# Patient Record
Sex: FEMALE | Race: WHITE | NOT HISPANIC OR LATINO | Employment: OTHER | URBAN - NONMETROPOLITAN AREA
[De-identification: names, ages, dates, MRNs, and addresses within clinical notes are randomized per-mention and may not be internally consistent; named-entity substitution may affect disease eponyms.]

---

## 2017-01-01 ENCOUNTER — APPOINTMENT (OUTPATIENT)
Dept: GENERAL RADIOLOGY | Facility: HOSPITAL | Age: 72
End: 2017-01-01

## 2017-01-01 ENCOUNTER — APPOINTMENT (OUTPATIENT)
Dept: CT IMAGING | Facility: HOSPITAL | Age: 72
End: 2017-01-01

## 2017-01-01 ENCOUNTER — APPOINTMENT (OUTPATIENT)
Dept: ULTRASOUND IMAGING | Facility: HOSPITAL | Age: 72
End: 2017-01-01

## 2017-01-01 ENCOUNTER — HOSPITAL ENCOUNTER (EMERGENCY)
Facility: HOSPITAL | Age: 72
Discharge: ANOTHER HOSPITAL | End: 2017-11-28
Attending: EMERGENCY MEDICINE | Admitting: EMERGENCY MEDICINE

## 2017-01-01 VITALS
HEIGHT: 64 IN | SYSTOLIC BLOOD PRESSURE: 118 MMHG | HEART RATE: 89 BPM | WEIGHT: 165 LBS | RESPIRATION RATE: 18 BRPM | OXYGEN SATURATION: 95 % | TEMPERATURE: 97.6 F | DIASTOLIC BLOOD PRESSURE: 72 MMHG | BODY MASS INDEX: 28.17 KG/M2

## 2017-01-01 DIAGNOSIS — L03.114 CELLULITIS OF LEFT UPPER EXTREMITY: Primary | ICD-10-CM

## 2017-01-01 DIAGNOSIS — R60.1 ANASARCA: ICD-10-CM

## 2017-01-01 DIAGNOSIS — N28.9 ACUTE RENAL INSUFFICIENCY: ICD-10-CM

## 2017-01-01 DIAGNOSIS — J90 BILATERAL PLEURAL EFFUSION: ICD-10-CM

## 2017-01-01 DIAGNOSIS — R73.9 HYPERGLYCEMIA: ICD-10-CM

## 2017-01-01 LAB
ALBUMIN SERPL-MCNC: 2.8 G/DL (ref 3.5–5)
ALBUMIN/GLOB SERPL: 0.9 G/DL (ref 1–2)
ALP SERPL-CCNC: 140 U/L (ref 38–126)
ALT SERPL W P-5'-P-CCNC: 37 U/L (ref 13–69)
ANION GAP SERPL CALCULATED.3IONS-SCNC: 17.9 MMOL/L
AST SERPL-CCNC: 19 U/L (ref 15–46)
BACTERIA SPEC AEROBE CULT: ABNORMAL
BACTERIA SPEC AEROBE CULT: ABNORMAL
BACTERIA UR QL AUTO: ABNORMAL /HPF
BASOPHILS # BLD AUTO: 0.06 10*3/MM3 (ref 0–0.2)
BASOPHILS NFR BLD AUTO: 0.3 % (ref 0–2.5)
BILIRUB SERPL-MCNC: 0.9 MG/DL (ref 0.2–1.3)
BILIRUB UR QL STRIP: NEGATIVE
BUN BLD-MCNC: 60 MG/DL (ref 7–20)
BUN/CREAT SERPL: 46.2 (ref 7.1–23.5)
CALCIUM SPEC-SCNC: 8.4 MG/DL (ref 8.4–10.2)
CHLORIDE SERPL-SCNC: 94 MMOL/L (ref 98–107)
CLARITY UR: ABNORMAL
CO2 SERPL-SCNC: 18 MMOL/L (ref 26–30)
COLOR UR: YELLOW
CREAT BLD-MCNC: 1.3 MG/DL (ref 0.6–1.3)
CRP SERPL-MCNC: 20.2 MG/DL (ref 0–1)
DEPRECATED RDW RBC AUTO: 51.9 FL (ref 37–54)
EOSINOPHIL # BLD AUTO: 0.02 10*3/MM3 (ref 0–0.7)
EOSINOPHIL NFR BLD AUTO: 0.1 % (ref 0–7)
ERYTHROCYTE [DISTWIDTH] IN BLOOD BY AUTOMATED COUNT: 17.2 % (ref 11.5–14.5)
ERYTHROCYTE [SEDIMENTATION RATE] IN BLOOD: 23 MM/HR (ref 0–20)
GFR SERPL CREATININE-BSD FRML MDRD: 40 ML/MIN/1.73
GLOBULIN UR ELPH-MCNC: 3.1 GM/DL
GLUCOSE BLD-MCNC: 539 MG/DL (ref 74–98)
GLUCOSE UR STRIP-MCNC: ABNORMAL MG/DL
HCT VFR BLD AUTO: 36.2 % (ref 37–47)
HGB BLD-MCNC: 11 G/DL (ref 12–16)
HGB UR QL STRIP.AUTO: ABNORMAL
HYALINE CASTS UR QL AUTO: ABNORMAL /LPF
IMM GRANULOCYTES # BLD: 0.18 10*3/MM3 (ref 0–0.06)
IMM GRANULOCYTES NFR BLD: 0.8 % (ref 0–0.6)
KETONES UR QL STRIP: ABNORMAL
LEUKOCYTE ESTERASE UR QL STRIP.AUTO: NEGATIVE
LYMPHOCYTES # BLD AUTO: 0.78 10*3/MM3 (ref 0.6–3.4)
LYMPHOCYTES NFR BLD AUTO: 3.5 % (ref 10–50)
MCH RBC QN AUTO: 25.3 PG (ref 27–31)
MCHC RBC AUTO-ENTMCNC: 30.4 G/DL (ref 30–37)
MCV RBC AUTO: 83.2 FL (ref 81–99)
MONOCYTES # BLD AUTO: 1.23 10*3/MM3 (ref 0–0.9)
MONOCYTES NFR BLD AUTO: 5.6 % (ref 0–12)
NEUTROPHILS # BLD AUTO: 19.77 10*3/MM3 (ref 2–6.9)
NEUTROPHILS NFR BLD AUTO: 89.7 % (ref 37–80)
NITRITE UR QL STRIP: NEGATIVE
NRBC BLD MANUAL-RTO: 0 /100 WBC (ref 0–0)
NT-PROBNP SERPL-MCNC: ABNORMAL PG/ML (ref 0–125)
PH UR STRIP.AUTO: <=5 [PH] (ref 5–8)
PLATELET # BLD AUTO: 337 10*3/MM3 (ref 130–400)
PMV BLD AUTO: 10.3 FL (ref 6–12)
POTASSIUM BLD-SCNC: 4.9 MMOL/L (ref 3.5–5.1)
PROT SERPL-MCNC: 5.9 G/DL (ref 6.3–8.2)
PROT UR QL STRIP: NEGATIVE
RBC # BLD AUTO: 4.35 10*6/MM3 (ref 4.2–5.4)
RBC # UR: ABNORMAL /HPF
REF LAB TEST METHOD: ABNORMAL
SODIUM BLD-SCNC: 125 MMOL/L (ref 137–145)
SP GR UR STRIP: 1.01 (ref 1–1.03)
SQUAMOUS #/AREA URNS HPF: ABNORMAL /HPF
UROBILINOGEN UR QL STRIP: ABNORMAL
WBC NRBC COR # BLD: 22.04 10*3/MM3 (ref 4.8–10.8)
WBC UR QL AUTO: ABNORMAL /HPF

## 2017-01-01 PROCEDURE — 85025 COMPLETE CBC W/AUTO DIFF WBC: CPT | Performed by: EMERGENCY MEDICINE

## 2017-01-01 PROCEDURE — 93971 EXTREMITY STUDY: CPT

## 2017-01-01 PROCEDURE — 87077 CULTURE AEROBIC IDENTIFY: CPT | Performed by: EMERGENCY MEDICINE

## 2017-01-01 PROCEDURE — 87147 CULTURE TYPE IMMUNOLOGIC: CPT | Performed by: EMERGENCY MEDICINE

## 2017-01-01 PROCEDURE — 85651 RBC SED RATE NONAUTOMATED: CPT | Performed by: EMERGENCY MEDICINE

## 2017-01-01 PROCEDURE — P9612 CATHETERIZE FOR URINE SPEC: HCPCS

## 2017-01-01 PROCEDURE — 86140 C-REACTIVE PROTEIN: CPT | Performed by: EMERGENCY MEDICINE

## 2017-01-01 PROCEDURE — 96365 THER/PROPH/DIAG IV INF INIT: CPT

## 2017-01-01 PROCEDURE — 71010 HC CHEST PA OR AP: CPT

## 2017-01-01 PROCEDURE — 87086 URINE CULTURE/COLONY COUNT: CPT | Performed by: EMERGENCY MEDICINE

## 2017-01-01 PROCEDURE — 73080 X-RAY EXAM OF ELBOW: CPT

## 2017-01-01 PROCEDURE — 83880 ASSAY OF NATRIURETIC PEPTIDE: CPT | Performed by: EMERGENCY MEDICINE

## 2017-01-01 PROCEDURE — 87186 SC STD MICRODIL/AGAR DIL: CPT | Performed by: EMERGENCY MEDICINE

## 2017-01-01 PROCEDURE — 81001 URINALYSIS AUTO W/SCOPE: CPT | Performed by: EMERGENCY MEDICINE

## 2017-01-01 PROCEDURE — 99284 EMERGENCY DEPT VISIT MOD MDM: CPT

## 2017-01-01 PROCEDURE — 80053 COMPREHEN METABOLIC PANEL: CPT | Performed by: EMERGENCY MEDICINE

## 2017-01-01 PROCEDURE — 71250 CT THORAX DX C-: CPT

## 2017-01-01 RX ORDER — POTASSIUM CHLORIDE 750 MG/1
10 TABLET, FILM COATED, EXTENDED RELEASE ORAL 2 TIMES DAILY
COMMUNITY

## 2017-01-01 RX ORDER — CLINDAMYCIN PHOSPHATE 600 MG/50ML
600 INJECTION, SOLUTION INTRAVENOUS ONCE
Status: COMPLETED | OUTPATIENT
Start: 2017-01-01 | End: 2017-01-01

## 2017-01-01 RX ORDER — FUROSEMIDE 40 MG/1
40 TABLET ORAL 2 TIMES DAILY
Status: ON HOLD | COMMUNITY
End: 2018-01-01

## 2017-01-01 RX ORDER — SPIRONOLACTONE 25 MG/1
25 TABLET ORAL DAILY
COMMUNITY

## 2017-01-01 RX ADMIN — CLINDAMYCIN PHOSPHATE 600 MG: 600 INJECTION, SOLUTION INTRAVENOUS at 23:42

## 2018-01-01 ENCOUNTER — HOSPITAL ENCOUNTER (INPATIENT)
Facility: HOSPITAL | Age: 73
LOS: 21 days | End: 2018-06-06
Attending: EMERGENCY MEDICINE | Admitting: HOSPITALIST

## 2018-01-01 ENCOUNTER — HOSPITAL ENCOUNTER (INPATIENT)
Facility: HOSPITAL | Age: 73
LOS: 5 days | End: 2018-06-11
Attending: INTERNAL MEDICINE | Admitting: INTERNAL MEDICINE

## 2018-01-01 ENCOUNTER — APPOINTMENT (OUTPATIENT)
Dept: GENERAL RADIOLOGY | Facility: HOSPITAL | Age: 73
End: 2018-01-01

## 2018-01-01 ENCOUNTER — ANESTHESIA (OUTPATIENT)
Dept: GASTROENTEROLOGY | Facility: HOSPITAL | Age: 73
End: 2018-01-01

## 2018-01-01 ENCOUNTER — APPOINTMENT (OUTPATIENT)
Dept: ULTRASOUND IMAGING | Facility: HOSPITAL | Age: 73
End: 2018-01-01

## 2018-01-01 ENCOUNTER — APPOINTMENT (OUTPATIENT)
Dept: CARDIOLOGY | Facility: HOSPITAL | Age: 73
End: 2018-01-01
Attending: INTERNAL MEDICINE

## 2018-01-01 ENCOUNTER — ANESTHESIA EVENT (OUTPATIENT)
Dept: GASTROENTEROLOGY | Facility: HOSPITAL | Age: 73
End: 2018-01-01

## 2018-01-01 VITALS
BODY MASS INDEX: 27.71 KG/M2 | DIASTOLIC BLOOD PRESSURE: 51 MMHG | TEMPERATURE: 94.3 F | RESPIRATION RATE: 14 BRPM | OXYGEN SATURATION: 100 % | WEIGHT: 162.3 LBS | SYSTOLIC BLOOD PRESSURE: 92 MMHG | HEART RATE: 76 BPM | HEIGHT: 64 IN

## 2018-01-01 VITALS
OXYGEN SATURATION: 100 % | HEART RATE: 76 BPM | TEMPERATURE: 94.3 F | DIASTOLIC BLOOD PRESSURE: 51 MMHG | WEIGHT: 162.26 LBS | BODY MASS INDEX: 27.7 KG/M2 | SYSTOLIC BLOOD PRESSURE: 92 MMHG | HEIGHT: 64 IN | RESPIRATION RATE: 8 BRPM

## 2018-01-01 DIAGNOSIS — R13.10 DYSPHAGIA, UNSPECIFIED TYPE: ICD-10-CM

## 2018-01-01 DIAGNOSIS — Z74.09 DECREASED AMBULATION STATUS: ICD-10-CM

## 2018-01-01 DIAGNOSIS — Z74.09 IMPAIRED MOBILITY AND ADLS: ICD-10-CM

## 2018-01-01 DIAGNOSIS — K92.2 ACUTE GI BLEEDING: Primary | ICD-10-CM

## 2018-01-01 DIAGNOSIS — Z74.09 IMPAIRED FUNCTIONAL MOBILITY, BALANCE, GAIT, AND ENDURANCE: ICD-10-CM

## 2018-01-01 DIAGNOSIS — L89.154 DECUBITUS ULCER OF SACRAL REGION, STAGE 4 (HCC): ICD-10-CM

## 2018-01-01 DIAGNOSIS — N18.9 CHRONIC KIDNEY DISEASE, UNSPECIFIED CKD STAGE: ICD-10-CM

## 2018-01-01 DIAGNOSIS — Z79.01 PROPHYLACTIC USE OF UNFRACTIONATED HEPARIN FOR VENOUS THROMBOEMBOLISM: ICD-10-CM

## 2018-01-01 DIAGNOSIS — I95.89 OTHER SPECIFIED HYPOTENSION: ICD-10-CM

## 2018-01-01 DIAGNOSIS — Z78.9 IMPAIRED MOBILITY AND ADLS: ICD-10-CM

## 2018-01-01 DIAGNOSIS — Z87.19 HISTORY OF DUODENAL ULCER: ICD-10-CM

## 2018-01-01 DIAGNOSIS — I42.9 CARDIOMYOPATHY, UNSPECIFIED TYPE (HCC): ICD-10-CM

## 2018-01-01 LAB
ABO + RH BLD: NORMAL
ABO GROUP BLD: NORMAL
ABO GROUP BLD: NORMAL
ALBUMIN SERPL-MCNC: 2.9 G/DL (ref 3.2–4.8)
ALBUMIN SERPL-MCNC: 3 G/DL (ref 3.2–4.8)
ALBUMIN SERPL-MCNC: 3.1 G/DL (ref 3.2–4.8)
ALBUMIN SERPL-MCNC: 3.6 G/DL (ref 3.2–4.8)
ALBUMIN/GLOB SERPL: 0.8 G/DL (ref 1.5–2.5)
ALBUMIN/GLOB SERPL: 0.9 G/DL (ref 1.5–2.5)
ALBUMIN/GLOB SERPL: 1 G/DL (ref 1.5–2.5)
ALBUMIN/GLOB SERPL: 1.1 G/DL (ref 1.5–2.5)
ALP SERPL-CCNC: 100 U/L (ref 25–100)
ALP SERPL-CCNC: 101 U/L (ref 25–100)
ALP SERPL-CCNC: 103 U/L (ref 25–100)
ALP SERPL-CCNC: 113 U/L (ref 25–100)
ALP SERPL-CCNC: 144 U/L (ref 25–100)
ALP SERPL-CCNC: 75 U/L (ref 25–100)
ALT SERPL W P-5'-P-CCNC: 11 U/L (ref 7–40)
ALT SERPL W P-5'-P-CCNC: 13 U/L (ref 7–40)
ALT SERPL W P-5'-P-CCNC: 13 U/L (ref 7–40)
ALT SERPL W P-5'-P-CCNC: 3 U/L (ref 7–40)
ALT SERPL W P-5'-P-CCNC: 5 U/L (ref 7–40)
ALT SERPL W P-5'-P-CCNC: 7 U/L (ref 7–40)
ANA SER QL: NEGATIVE
ANION GAP SERPL CALCULATED.3IONS-SCNC: 13 MMOL/L (ref 3–11)
ANION GAP SERPL CALCULATED.3IONS-SCNC: 14 MMOL/L (ref 3–11)
ANION GAP SERPL CALCULATED.3IONS-SCNC: 16 MMOL/L (ref 3–11)
ANION GAP SERPL CALCULATED.3IONS-SCNC: 16 MMOL/L (ref 3–11)
ANION GAP SERPL CALCULATED.3IONS-SCNC: 17 MMOL/L (ref 3–11)
ANION GAP SERPL CALCULATED.3IONS-SCNC: 19 MMOL/L (ref 3–11)
ANION GAP SERPL CALCULATED.3IONS-SCNC: 21 MMOL/L (ref 3–11)
AST SERPL-CCNC: 12 U/L (ref 0–33)
AST SERPL-CCNC: 13 U/L (ref 0–33)
AST SERPL-CCNC: 16 U/L (ref 0–33)
AST SERPL-CCNC: 20 U/L (ref 0–33)
AST SERPL-CCNC: 20 U/L (ref 0–33)
AST SERPL-CCNC: 8 U/L (ref 0–33)
BACTERIA SPEC AEROBE CULT: ABNORMAL
BACTERIA SPEC AEROBE CULT: ABNORMAL
BACTERIA UR QL AUTO: ABNORMAL /HPF
BASOPHILS # BLD AUTO: 0.02 10*3/MM3 (ref 0–0.2)
BASOPHILS # BLD AUTO: 0.03 10*3/MM3 (ref 0–0.2)
BASOPHILS # BLD AUTO: 0.04 10*3/MM3 (ref 0–0.2)
BASOPHILS # BLD AUTO: 0.04 10*3/MM3 (ref 0–0.2)
BASOPHILS NFR BLD AUTO: 0.2 % (ref 0–1)
BASOPHILS NFR BLD AUTO: 0.3 % (ref 0–1)
BASOPHILS NFR BLD AUTO: 0.4 % (ref 0–1)
BASOPHILS NFR BLD AUTO: 0.4 % (ref 0–1)
BH BB BLOOD EXPIRATION DATE: NORMAL
BH BB BLOOD TYPE BARCODE: 9500
BH BB DISPENSE STATUS: NORMAL
BH BB PRODUCT CODE: NORMAL
BH BB UNIT NUMBER: NORMAL
BH CV ECHO MEAS - AO ROOT AREA (BSA CORRECTED): 1.3
BH CV ECHO MEAS - AO ROOT AREA: 4 CM^2
BH CV ECHO MEAS - AO ROOT DIAM: 2.3 CM
BH CV ECHO MEAS - BSA(HAYCOCK): 1.8 M^2
BH CV ECHO MEAS - BSA: 1.8 M^2
BH CV ECHO MEAS - BZI_BMI: 27.8 KILOGRAMS/M^2
BH CV ECHO MEAS - BZI_METRIC_HEIGHT: 162.6 CM
BH CV ECHO MEAS - BZI_METRIC_WEIGHT: 73.5 KG
BH CV ECHO MEAS - CONTRAST EF (2CH): 23.8 ML/M^2
BH CV ECHO MEAS - CONTRAST EF 4CH: 27.3 ML/M^2
BH CV ECHO MEAS - EDV(CUBED): 127 ML
BH CV ECHO MEAS - EDV(MOD-SP2): 105 ML
BH CV ECHO MEAS - EDV(MOD-SP4): 88 ML
BH CV ECHO MEAS - EDV(TEICH): 119.7 ML
BH CV ECHO MEAS - EF(CUBED): 34.8 %
BH CV ECHO MEAS - EF(MOD-BP): 20 %
BH CV ECHO MEAS - EF(MOD-SP2): 23.8 %
BH CV ECHO MEAS - EF(MOD-SP4): 27.3 %
BH CV ECHO MEAS - EF(TEICH): 28.4 %
BH CV ECHO MEAS - ESV(CUBED): 82.8 ML
BH CV ECHO MEAS - ESV(MOD-SP2): 80 ML
BH CV ECHO MEAS - ESV(MOD-SP4): 64 ML
BH CV ECHO MEAS - ESV(TEICH): 85.8 ML
BH CV ECHO MEAS - FS: 13.3 %
BH CV ECHO MEAS - IVS/LVPW: 1.1
BH CV ECHO MEAS - IVSD: 1.1 CM
BH CV ECHO MEAS - LA DIMENSION: 4.4 CM
BH CV ECHO MEAS - LA/AO: 2
BH CV ECHO MEAS - LAT PEAK E' VEL: 7.1 CM/SEC
BH CV ECHO MEAS - LV DIASTOLIC VOL/BSA (35-75): 49.2 ML/M^2
BH CV ECHO MEAS - LV MASS(C)D: 201.9 GRAMS
BH CV ECHO MEAS - LV MASS(C)DI: 112.9 GRAMS/M^2
BH CV ECHO MEAS - LV SYSTOLIC VOL/BSA (12-30): 35.8 ML/M^2
BH CV ECHO MEAS - LVIDD: 5 CM
BH CV ECHO MEAS - LVIDS: 4.4 CM
BH CV ECHO MEAS - LVLD AP2: 8 CM
BH CV ECHO MEAS - LVLD AP4: 7.7 CM
BH CV ECHO MEAS - LVLS AP2: 8.2 CM
BH CV ECHO MEAS - LVLS AP4: 8.3 CM
BH CV ECHO MEAS - LVPWD: 1 CM
BH CV ECHO MEAS - MED PEAK E' VEL: 4.2 CM/SEC
BH CV ECHO MEAS - MV A MAX VEL: 85.9 CM/SEC
BH CV ECHO MEAS - MV DEC SLOPE: 345.4 CM/SEC^2
BH CV ECHO MEAS - MV E MAX VEL: 106.6 CM/SEC
BH CV ECHO MEAS - MV E/A: 1.2
BH CV ECHO MEAS - PA ACC SLOPE: 646.9 CM/SEC^2
BH CV ECHO MEAS - PA ACC TIME: 0.1 SEC
BH CV ECHO MEAS - PA PR(ACCEL): 34.4 MMHG
BH CV ECHO MEAS - RAP SYSTOLE: 20 MMHG
BH CV ECHO MEAS - RVDD: 2.9 CM
BH CV ECHO MEAS - RVSP: 48 MMHG
BH CV ECHO MEAS - SI(CUBED): 24.7 ML/M^2
BH CV ECHO MEAS - SI(MOD-SP2): 14 ML/M^2
BH CV ECHO MEAS - SI(MOD-SP4): 13.4 ML/M^2
BH CV ECHO MEAS - SI(TEICH): 19 ML/M^2
BH CV ECHO MEAS - SV(CUBED): 44.2 ML
BH CV ECHO MEAS - SV(MOD-SP2): 25 ML
BH CV ECHO MEAS - SV(MOD-SP4): 24 ML
BH CV ECHO MEAS - SV(TEICH): 33.9 ML
BH CV ECHO MEAS - TAPSE (>1.6): 1.4 CM2
BH CV ECHO MEAS - TR MAX VEL: 262.4 CM/SEC
BH CV ECHO MEASUREMENTS AVERAGE E/E' RATIO: 18.87
BH CV XLRA - RV BASE: 4.1 CM
BH CV XLRA - RV LENGTH: 7.6 CM
BH CV XLRA - RV MID: 3 CM
BH CV XLRA - TDI S': 7.7 CM/SEC
BILIRUB SERPL-MCNC: 0.2 MG/DL (ref 0.3–1.2)
BILIRUB SERPL-MCNC: 0.7 MG/DL (ref 0.3–1.2)
BILIRUB SERPL-MCNC: 0.8 MG/DL (ref 0.3–1.2)
BILIRUB SERPL-MCNC: 0.8 MG/DL (ref 0.3–1.2)
BILIRUB SERPL-MCNC: 1 MG/DL (ref 0.3–1.2)
BILIRUB SERPL-MCNC: 1.8 MG/DL (ref 0.3–1.2)
BILIRUB UR QL STRIP: NEGATIVE
BLD GP AB SCN SERPL QL: NEGATIVE
BNP SERPL-MCNC: 1984 PG/ML (ref 0–100)
BNP SERPL-MCNC: >5000 PG/ML (ref 0–100)
BUN BLD-MCNC: 124 MG/DL (ref 9–23)
BUN BLD-MCNC: 125 MG/DL (ref 9–23)
BUN BLD-MCNC: 130 MG/DL (ref 9–23)
BUN BLD-MCNC: 130 MG/DL (ref 9–23)
BUN BLD-MCNC: 133 MG/DL (ref 9–23)
BUN BLD-MCNC: 136 MG/DL (ref 9–23)
BUN BLD-MCNC: 139 MG/DL (ref 9–23)
BUN/CREAT SERPL: 22.8 (ref 7–25)
BUN/CREAT SERPL: 25.8 (ref 7–25)
BUN/CREAT SERPL: 26.6 (ref 7–25)
BUN/CREAT SERPL: 26.6 (ref 7–25)
BUN/CREAT SERPL: 26.7 (ref 7–25)
BUN/CREAT SERPL: 27.1 (ref 7–25)
BUN/CREAT SERPL: 28.4 (ref 7–25)
C-ANCA TITR SER IF: NORMAL TITER
CA-I SERPL ISE-MCNC: 1.03 MMOL/L (ref 1.12–1.32)
CALCIUM SPEC-SCNC: 7.8 MG/DL (ref 8.7–10.4)
CALCIUM SPEC-SCNC: 7.9 MG/DL (ref 8.7–10.4)
CALCIUM SPEC-SCNC: 7.9 MG/DL (ref 8.7–10.4)
CALCIUM SPEC-SCNC: 8.4 MG/DL (ref 8.7–10.4)
CALCIUM SPEC-SCNC: 8.4 MG/DL (ref 8.7–10.4)
CALCIUM SPEC-SCNC: 8.6 MG/DL (ref 8.7–10.4)
CALCIUM SPEC-SCNC: 8.8 MG/DL (ref 8.7–10.4)
CHLORIDE SERPL-SCNC: 83 MMOL/L (ref 99–109)
CHLORIDE SERPL-SCNC: 86 MMOL/L (ref 99–109)
CHLORIDE SERPL-SCNC: 87 MMOL/L (ref 99–109)
CHLORIDE SERPL-SCNC: 89 MMOL/L (ref 99–109)
CHLORIDE SERPL-SCNC: 90 MMOL/L (ref 99–109)
CHLORIDE SERPL-SCNC: 90 MMOL/L (ref 99–109)
CHLORIDE SERPL-SCNC: 93 MMOL/L (ref 99–109)
CK SERPL-CCNC: 19 U/L (ref 26–174)
CLARITY UR: CLEAR
CO2 SERPL-SCNC: 19 MMOL/L (ref 20–31)
CO2 SERPL-SCNC: 24 MMOL/L (ref 20–31)
CO2 SERPL-SCNC: 25 MMOL/L (ref 20–31)
CO2 SERPL-SCNC: 25 MMOL/L (ref 20–31)
CO2 SERPL-SCNC: 28 MMOL/L (ref 20–31)
CO2 SERPL-SCNC: 30 MMOL/L (ref 20–31)
CO2 SERPL-SCNC: 31 MMOL/L (ref 20–31)
COLOR UR: YELLOW
CORTIS SERPL-MCNC: 31.3 MCG/DL
CORTIS SERPL-MCNC: 47.4 MCG/DL
CORTIS SERPL-MCNC: 61.7 MCG/DL
CREAT BLD-MCNC: 4.7 MG/DL (ref 0.6–1.3)
CREAT BLD-MCNC: 4.8 MG/DL (ref 0.6–1.3)
CREAT BLD-MCNC: 4.8 MG/DL (ref 0.6–1.3)
CREAT BLD-MCNC: 4.9 MG/DL (ref 0.6–1.3)
CREAT BLD-MCNC: 5 MG/DL (ref 0.6–1.3)
CREAT BLD-MCNC: 5.1 MG/DL (ref 0.6–1.3)
CREAT BLD-MCNC: 5.7 MG/DL (ref 0.6–1.3)
CREAT UR-MCNC: 37.9 MG/DL
CYTO UR: NORMAL
D-LACTATE SERPL-SCNC: 1.1 MMOL/L (ref 0.5–2)
D-LACTATE SERPL-SCNC: 1.5 MMOL/L (ref 0.5–2)
D-LACTATE SERPL-SCNC: 1.5 MMOL/L (ref 0.5–2)
D-LACTATE SERPL-SCNC: 1.6 MMOL/L (ref 0.5–2)
D-LACTATE SERPL-SCNC: 1.8 MMOL/L (ref 0.5–2)
D-LACTATE SERPL-SCNC: 2.4 MMOL/L (ref 0.5–2)
DEPRECATED RDW RBC AUTO: 62.7 FL (ref 37–54)
DEPRECATED RDW RBC AUTO: 62.9 FL (ref 37–54)
DEPRECATED RDW RBC AUTO: 63.9 FL (ref 37–54)
DEPRECATED RDW RBC AUTO: 65.7 FL (ref 37–54)
DEPRECATED RDW RBC AUTO: 69.3 FL (ref 37–54)
DEPRECATED RDW RBC AUTO: 70.4 FL (ref 37–54)
DEVELOPER EXPIRATION DATE: ABNORMAL
DEVELOPER LOT NUMBER: ABNORMAL
EOSINOPHIL # BLD AUTO: 0 10*3/MM3 (ref 0–0.3)
EOSINOPHIL # BLD AUTO: 0.06 10*3/MM3 (ref 0–0.3)
EOSINOPHIL # BLD AUTO: 0.09 10*3/MM3 (ref 0–0.3)
EOSINOPHIL # BLD AUTO: 0.11 10*3/MM3 (ref 0–0.3)
EOSINOPHIL # BLD AUTO: 0.13 10*3/MM3 (ref 0–0.3)
EOSINOPHIL # BLD AUTO: 0.13 10*3/MM3 (ref 0–0.3)
EOSINOPHIL NFR BLD AUTO: 0 % (ref 0–3)
EOSINOPHIL NFR BLD AUTO: 0.6 % (ref 0–3)
EOSINOPHIL NFR BLD AUTO: 1 % (ref 0–3)
EOSINOPHIL NFR BLD AUTO: 1.1 % (ref 0–3)
EOSINOPHIL NFR BLD AUTO: 1.2 % (ref 0–3)
EOSINOPHIL NFR BLD AUTO: 1.4 % (ref 0–3)
EOSINOPHIL SPEC QL MICRO: 0 % EOS/100 CELLS (ref 0–0)
ERYTHROCYTE [DISTWIDTH] IN BLOOD BY AUTOMATED COUNT: 19.3 % (ref 11.3–14.5)
ERYTHROCYTE [DISTWIDTH] IN BLOOD BY AUTOMATED COUNT: 19.5 % (ref 11.3–14.5)
ERYTHROCYTE [DISTWIDTH] IN BLOOD BY AUTOMATED COUNT: 19.7 % (ref 11.3–14.5)
ERYTHROCYTE [DISTWIDTH] IN BLOOD BY AUTOMATED COUNT: 19.9 % (ref 11.3–14.5)
ERYTHROCYTE [DISTWIDTH] IN BLOOD BY AUTOMATED COUNT: 19.9 % (ref 11.3–14.5)
ERYTHROCYTE [DISTWIDTH] IN BLOOD BY AUTOMATED COUNT: 20.4 % (ref 11.3–14.5)
EST. AVERAGE GLUCOSE BLD GHB EST-MCNC: 105 MG/DL
EXPIRATION DATE: ABNORMAL
FECAL OCCULT BLOOD SCREEN, POC: POSITIVE
GFR SERPL CREATININE-BSD FRML MDRD: 7 ML/MIN/1.73
GFR SERPL CREATININE-BSD FRML MDRD: 8 ML/MIN/1.73
GFR SERPL CREATININE-BSD FRML MDRD: 9 ML/MIN/1.73
GLOBULIN UR ELPH-MCNC: 3.1 GM/DL
GLOBULIN UR ELPH-MCNC: 3.1 GM/DL
GLOBULIN UR ELPH-MCNC: 3.2 GM/DL
GLOBULIN UR ELPH-MCNC: 3.3 GM/DL
GLOBULIN UR ELPH-MCNC: 3.3 GM/DL
GLOBULIN UR ELPH-MCNC: 3.8 GM/DL
GLUCOSE BLD-MCNC: 100 MG/DL (ref 70–100)
GLUCOSE BLD-MCNC: 125 MG/DL (ref 70–100)
GLUCOSE BLD-MCNC: 163 MG/DL (ref 70–100)
GLUCOSE BLD-MCNC: 189 MG/DL (ref 70–100)
GLUCOSE BLD-MCNC: 89 MG/DL (ref 70–100)
GLUCOSE BLD-MCNC: 98 MG/DL (ref 70–100)
GLUCOSE BLD-MCNC: 99 MG/DL (ref 70–100)
GLUCOSE BLDC GLUCOMTR-MCNC: 102 MG/DL (ref 70–130)
GLUCOSE BLDC GLUCOMTR-MCNC: 104 MG/DL (ref 70–130)
GLUCOSE BLDC GLUCOMTR-MCNC: 105 MG/DL (ref 70–130)
GLUCOSE BLDC GLUCOMTR-MCNC: 106 MG/DL (ref 70–130)
GLUCOSE BLDC GLUCOMTR-MCNC: 106 MG/DL (ref 70–130)
GLUCOSE BLDC GLUCOMTR-MCNC: 107 MG/DL (ref 70–130)
GLUCOSE BLDC GLUCOMTR-MCNC: 109 MG/DL (ref 70–130)
GLUCOSE BLDC GLUCOMTR-MCNC: 109 MG/DL (ref 70–130)
GLUCOSE BLDC GLUCOMTR-MCNC: 110 MG/DL (ref 70–130)
GLUCOSE BLDC GLUCOMTR-MCNC: 115 MG/DL (ref 70–130)
GLUCOSE BLDC GLUCOMTR-MCNC: 116 MG/DL (ref 70–130)
GLUCOSE BLDC GLUCOMTR-MCNC: 119 MG/DL (ref 70–130)
GLUCOSE BLDC GLUCOMTR-MCNC: 119 MG/DL (ref 70–130)
GLUCOSE BLDC GLUCOMTR-MCNC: 121 MG/DL (ref 70–130)
GLUCOSE BLDC GLUCOMTR-MCNC: 129 MG/DL (ref 70–130)
GLUCOSE BLDC GLUCOMTR-MCNC: 131 MG/DL (ref 70–130)
GLUCOSE BLDC GLUCOMTR-MCNC: 131 MG/DL (ref 70–130)
GLUCOSE BLDC GLUCOMTR-MCNC: 135 MG/DL (ref 70–130)
GLUCOSE BLDC GLUCOMTR-MCNC: 135 MG/DL (ref 70–130)
GLUCOSE BLDC GLUCOMTR-MCNC: 141 MG/DL (ref 70–130)
GLUCOSE BLDC GLUCOMTR-MCNC: 142 MG/DL (ref 70–130)
GLUCOSE BLDC GLUCOMTR-MCNC: 145 MG/DL (ref 70–130)
GLUCOSE BLDC GLUCOMTR-MCNC: 146 MG/DL (ref 70–130)
GLUCOSE BLDC GLUCOMTR-MCNC: 146 MG/DL (ref 70–130)
GLUCOSE BLDC GLUCOMTR-MCNC: 149 MG/DL (ref 70–130)
GLUCOSE BLDC GLUCOMTR-MCNC: 150 MG/DL (ref 70–130)
GLUCOSE BLDC GLUCOMTR-MCNC: 152 MG/DL (ref 70–130)
GLUCOSE BLDC GLUCOMTR-MCNC: 152 MG/DL (ref 70–130)
GLUCOSE BLDC GLUCOMTR-MCNC: 155 MG/DL (ref 70–130)
GLUCOSE BLDC GLUCOMTR-MCNC: 155 MG/DL (ref 70–130)
GLUCOSE BLDC GLUCOMTR-MCNC: 158 MG/DL (ref 70–130)
GLUCOSE BLDC GLUCOMTR-MCNC: 171 MG/DL (ref 70–130)
GLUCOSE BLDC GLUCOMTR-MCNC: 177 MG/DL (ref 70–130)
GLUCOSE BLDC GLUCOMTR-MCNC: 179 MG/DL (ref 70–130)
GLUCOSE BLDC GLUCOMTR-MCNC: 181 MG/DL (ref 70–130)
GLUCOSE BLDC GLUCOMTR-MCNC: 186 MG/DL (ref 70–130)
GLUCOSE BLDC GLUCOMTR-MCNC: 206 MG/DL (ref 70–130)
GLUCOSE BLDC GLUCOMTR-MCNC: 336 MG/DL (ref 70–130)
GLUCOSE BLDC GLUCOMTR-MCNC: 63 MG/DL (ref 70–130)
GLUCOSE BLDC GLUCOMTR-MCNC: 65 MG/DL (ref 70–130)
GLUCOSE BLDC GLUCOMTR-MCNC: 66 MG/DL (ref 70–130)
GLUCOSE BLDC GLUCOMTR-MCNC: 67 MG/DL (ref 70–130)
GLUCOSE BLDC GLUCOMTR-MCNC: 68 MG/DL (ref 70–130)
GLUCOSE BLDC GLUCOMTR-MCNC: 69 MG/DL (ref 70–130)
GLUCOSE BLDC GLUCOMTR-MCNC: 71 MG/DL (ref 70–130)
GLUCOSE BLDC GLUCOMTR-MCNC: 71 MG/DL (ref 70–130)
GLUCOSE BLDC GLUCOMTR-MCNC: 76 MG/DL (ref 70–130)
GLUCOSE BLDC GLUCOMTR-MCNC: 78 MG/DL (ref 70–130)
GLUCOSE BLDC GLUCOMTR-MCNC: 81 MG/DL (ref 70–130)
GLUCOSE BLDC GLUCOMTR-MCNC: 84 MG/DL (ref 70–130)
GLUCOSE BLDC GLUCOMTR-MCNC: 85 MG/DL (ref 70–130)
GLUCOSE BLDC GLUCOMTR-MCNC: 85 MG/DL (ref 70–130)
GLUCOSE BLDC GLUCOMTR-MCNC: 86 MG/DL (ref 70–130)
GLUCOSE BLDC GLUCOMTR-MCNC: 87 MG/DL (ref 70–130)
GLUCOSE BLDC GLUCOMTR-MCNC: 90 MG/DL (ref 70–130)
GLUCOSE BLDC GLUCOMTR-MCNC: 90 MG/DL (ref 70–130)
GLUCOSE BLDC GLUCOMTR-MCNC: 91 MG/DL (ref 70–130)
GLUCOSE BLDC GLUCOMTR-MCNC: 93 MG/DL (ref 70–130)
GLUCOSE BLDC GLUCOMTR-MCNC: 95 MG/DL (ref 70–130)
GLUCOSE BLDC GLUCOMTR-MCNC: 96 MG/DL (ref 70–130)
GLUCOSE UR STRIP-MCNC: NEGATIVE MG/DL
HBA1C MFR BLD: 5.3 % (ref 4.8–5.6)
HCT VFR BLD AUTO: 16.3 % (ref 34.5–44)
HCT VFR BLD AUTO: 22.1 % (ref 34.5–44)
HCT VFR BLD AUTO: 22.8 % (ref 34.5–44)
HCT VFR BLD AUTO: 25 % (ref 34.5–44)
HCT VFR BLD AUTO: 25.1 % (ref 34.5–44)
HCT VFR BLD AUTO: 25.8 % (ref 34.5–44)
HCT VFR BLD AUTO: 27 % (ref 34.5–44)
HCT VFR BLD AUTO: 30 % (ref 34.5–44)
HCT VFR BLD AUTO: 30 % (ref 34.5–44)
HCT VFR BLD AUTO: 30.5 % (ref 34.5–44)
HCT VFR BLD AUTO: 31.1 % (ref 34.5–44)
HCT VFR BLD AUTO: 35.2 % (ref 34.5–44)
HCT VFR BLD AUTO: 38.5 % (ref 34.5–44)
HGB BLD-MCNC: 10 G/DL (ref 11.5–15.5)
HGB BLD-MCNC: 10.1 G/DL (ref 11.5–15.5)
HGB BLD-MCNC: 10.1 G/DL (ref 11.5–15.5)
HGB BLD-MCNC: 11.5 G/DL (ref 11.5–15.5)
HGB BLD-MCNC: 12.3 G/DL (ref 11.5–15.5)
HGB BLD-MCNC: 5.1 G/DL (ref 11.5–15.5)
HGB BLD-MCNC: 7.1 G/DL (ref 11.5–15.5)
HGB BLD-MCNC: 7.4 G/DL (ref 11.5–15.5)
HGB BLD-MCNC: 8.1 G/DL (ref 11.5–15.5)
HGB BLD-MCNC: 8.4 G/DL (ref 11.5–15.5)
HGB BLD-MCNC: 8.5 G/DL (ref 11.5–15.5)
HGB BLD-MCNC: 8.6 G/DL (ref 11.5–15.5)
HGB BLD-MCNC: 9.9 G/DL (ref 11.5–15.5)
HGB UR QL STRIP.AUTO: ABNORMAL
HOLD SPECIMEN: NORMAL
HYALINE CASTS UR QL AUTO: ABNORMAL /LPF
IGA SERPL-MCNC: 495 MG/DL (ref 64–422)
IGG SERPL-MCNC: 1336 MG/DL (ref 700–1600)
IGM SERPL-MCNC: 52 MG/DL (ref 26–217)
IMM GRANULOCYTES # BLD: 0.02 10*3/MM3 (ref 0–0.03)
IMM GRANULOCYTES # BLD: 0.02 10*3/MM3 (ref 0–0.03)
IMM GRANULOCYTES # BLD: 0.03 10*3/MM3 (ref 0–0.03)
IMM GRANULOCYTES # BLD: 0.06 10*3/MM3 (ref 0–0.03)
IMM GRANULOCYTES # BLD: 0.07 10*3/MM3 (ref 0–0.03)
IMM GRANULOCYTES # BLD: 0.12 10*3/MM3 (ref 0–0.03)
IMM GRANULOCYTES NFR BLD: 0.2 % (ref 0–0.6)
IMM GRANULOCYTES NFR BLD: 0.2 % (ref 0–0.6)
IMM GRANULOCYTES NFR BLD: 0.3 % (ref 0–0.6)
IMM GRANULOCYTES NFR BLD: 0.5 % (ref 0–0.6)
IMM GRANULOCYTES NFR BLD: 0.7 % (ref 0–0.6)
IMM GRANULOCYTES NFR BLD: 1.2 % (ref 0–0.6)
INR PPP: 1.36 (ref 0.91–1.09)
INTERPRETATION UR IFE-IMP: NORMAL
IRON 24H UR-MRATE: 228 MCG/DL (ref 50–175)
IRON SATN MFR SERPL: 84 % (ref 15–50)
KETONES UR QL STRIP: NEGATIVE
LAB AP CASE REPORT: NORMAL
LAB AP CLINICAL INFORMATION: NORMAL
LEUKOCYTE ESTERASE UR QL STRIP.AUTO: ABNORMAL
LV EF 2D ECHO EST: 20 %
LYMPHOCYTES # BLD AUTO: 1.34 10*3/MM3 (ref 0.6–4.8)
LYMPHOCYTES # BLD AUTO: 1.56 10*3/MM3 (ref 0.6–4.8)
LYMPHOCYTES # BLD AUTO: 1.57 10*3/MM3 (ref 0.6–4.8)
LYMPHOCYTES # BLD AUTO: 1.8 10*3/MM3 (ref 0.6–4.8)
LYMPHOCYTES # BLD AUTO: 1.9 10*3/MM3 (ref 0.6–4.8)
LYMPHOCYTES # BLD AUTO: 1.97 10*3/MM3 (ref 0.6–4.8)
LYMPHOCYTES NFR BLD AUTO: 14.3 % (ref 24–44)
LYMPHOCYTES NFR BLD AUTO: 16.3 % (ref 24–44)
LYMPHOCYTES NFR BLD AUTO: 16.6 % (ref 24–44)
LYMPHOCYTES NFR BLD AUTO: 17.9 % (ref 24–44)
LYMPHOCYTES NFR BLD AUTO: 18.2 % (ref 24–44)
LYMPHOCYTES NFR BLD AUTO: 20.9 % (ref 24–44)
Lab: ABNORMAL
Lab: NORMAL
MAGNESIUM SERPL-MCNC: 2.2 MG/DL (ref 1.3–2.7)
MAGNESIUM SERPL-MCNC: 2.2 MG/DL (ref 1.3–2.7)
MAGNESIUM SERPL-MCNC: 2.3 MG/DL (ref 1.3–2.7)
MAGNESIUM SERPL-MCNC: 2.4 MG/DL (ref 1.3–2.7)
MAXIMAL PREDICTED HEART RATE: 149 BPM
MCH RBC QN AUTO: 29.6 PG (ref 27–31)
MCH RBC QN AUTO: 29.8 PG (ref 27–31)
MCH RBC QN AUTO: 29.9 PG (ref 27–31)
MCH RBC QN AUTO: 30.3 PG (ref 27–31)
MCH RBC QN AUTO: 30.6 PG (ref 27–31)
MCH RBC QN AUTO: 30.7 PG (ref 27–31)
MCHC RBC AUTO-ENTMCNC: 31.3 G/DL (ref 32–36)
MCHC RBC AUTO-ENTMCNC: 32.1 G/DL (ref 32–36)
MCHC RBC AUTO-ENTMCNC: 32.2 G/DL (ref 32–36)
MCHC RBC AUTO-ENTMCNC: 32.5 G/DL (ref 32–36)
MCHC RBC AUTO-ENTMCNC: 32.9 G/DL (ref 32–36)
MCHC RBC AUTO-ENTMCNC: 33.7 G/DL (ref 32–36)
MCV RBC AUTO: 88.5 FL (ref 80–99)
MCV RBC AUTO: 91 FL (ref 80–99)
MCV RBC AUTO: 92.8 FL (ref 80–99)
MCV RBC AUTO: 92.8 FL (ref 80–99)
MCV RBC AUTO: 94.4 FL (ref 80–99)
MCV RBC AUTO: 98.2 FL (ref 80–99)
MONOCYTES # BLD AUTO: 0.45 10*3/MM3 (ref 0–1)
MONOCYTES # BLD AUTO: 0.51 10*3/MM3 (ref 0–1)
MONOCYTES # BLD AUTO: 0.6 10*3/MM3 (ref 0–1)
MONOCYTES # BLD AUTO: 0.62 10*3/MM3 (ref 0–1)
MONOCYTES # BLD AUTO: 0.87 10*3/MM3 (ref 0–1)
MONOCYTES # BLD AUTO: 0.92 10*3/MM3 (ref 0–1)
MONOCYTES NFR BLD AUTO: 5.2 % (ref 0–12)
MONOCYTES NFR BLD AUTO: 5.4 % (ref 0–12)
MONOCYTES NFR BLD AUTO: 6.4 % (ref 0–12)
MONOCYTES NFR BLD AUTO: 6.6 % (ref 0–12)
MONOCYTES NFR BLD AUTO: 7.9 % (ref 0–12)
MONOCYTES NFR BLD AUTO: 8.7 % (ref 0–12)
MYELOPEROXIDASE AB SER-ACNC: <9 U/ML (ref 0–9)
NEGATIVE CONTROL: NEGATIVE
NEUTROPHILS # BLD AUTO: 6.45 10*3/MM3 (ref 1.5–8.3)
NEUTROPHILS # BLD AUTO: 6.67 10*3/MM3 (ref 1.5–8.3)
NEUTROPHILS # BLD AUTO: 7.17 10*3/MM3 (ref 1.5–8.3)
NEUTROPHILS # BLD AUTO: 7.26 10*3/MM3 (ref 1.5–8.3)
NEUTROPHILS # BLD AUTO: 7.32 10*3/MM3 (ref 1.5–8.3)
NEUTROPHILS # BLD AUTO: 8.71 10*3/MM3 (ref 1.5–8.3)
NEUTROPHILS NFR BLD AUTO: 70.9 % (ref 41–71)
NEUTROPHILS NFR BLD AUTO: 72.4 % (ref 41–71)
NEUTROPHILS NFR BLD AUTO: 74.4 % (ref 41–71)
NEUTROPHILS NFR BLD AUTO: 75.1 % (ref 41–71)
NEUTROPHILS NFR BLD AUTO: 76.5 % (ref 41–71)
NEUTROPHILS NFR BLD AUTO: 78.2 % (ref 41–71)
NITRITE UR QL STRIP: NEGATIVE
OSMOLALITY SERPL: 318 MOSM/KG (ref 275–295)
OSMOLALITY UR: 323 MOSM/KG (ref 300–1100)
P-ANCA ATYPICAL TITR SER IF: NORMAL TITER
P-ANCA TITR SER IF: NORMAL TITER
PATH REPORT.FINAL DX SPEC: NORMAL
PATH REPORT.GROSS SPEC: NORMAL
PH UR STRIP.AUTO: 8 [PH] (ref 5–8)
PHOSPHATE SERPL-MCNC: 8.2 MG/DL (ref 2.4–5.1)
PHOSPHATE SERPL-MCNC: 8.4 MG/DL (ref 2.4–5.1)
PHOSPHATE SERPL-MCNC: 8.8 MG/DL (ref 2.4–5.1)
PHOSPHATE SERPL-MCNC: 8.9 MG/DL (ref 2.4–5.1)
PHOSPHATE SERPL-MCNC: 9.1 MG/DL (ref 2.4–5.1)
PHOSPHATE SERPL-MCNC: 9.5 MG/DL (ref 2.4–5.1)
PLAT MORPH BLD: NORMAL
PLAT MORPH BLD: NORMAL
PLATELET # BLD AUTO: 232 10*3/MM3 (ref 150–450)
PLATELET # BLD AUTO: 254 10*3/MM3 (ref 150–450)
PLATELET # BLD AUTO: 267 10*3/MM3 (ref 150–450)
PLATELET # BLD AUTO: 270 10*3/MM3 (ref 150–450)
PLATELET # BLD AUTO: 300 10*3/MM3 (ref 150–450)
PLATELET # BLD AUTO: 306 10*3/MM3 (ref 150–450)
PMV BLD AUTO: 10.2 FL (ref 6–12)
PMV BLD AUTO: 10.3 FL (ref 6–12)
PMV BLD AUTO: 10.4 FL (ref 6–12)
PMV BLD AUTO: 9.6 FL (ref 6–12)
PMV BLD AUTO: 9.8 FL (ref 6–12)
PMV BLD AUTO: 9.9 FL (ref 6–12)
POSITIVE CONTROL: POSITIVE
POTASSIUM BLD-SCNC: 4.5 MMOL/L (ref 3.5–5.5)
POTASSIUM BLD-SCNC: 4.6 MMOL/L (ref 3.5–5.5)
POTASSIUM BLD-SCNC: 4.7 MMOL/L (ref 3.5–5.5)
POTASSIUM BLD-SCNC: 4.9 MMOL/L (ref 3.5–5.5)
POTASSIUM BLD-SCNC: 5 MMOL/L (ref 3.5–5.5)
POTASSIUM BLD-SCNC: 5.4 MMOL/L (ref 3.5–5.5)
POTASSIUM BLD-SCNC: 5.4 MMOL/L (ref 3.5–5.5)
PROT PATTERN SERPL IFE-IMP: ABNORMAL
PROT SERPL-MCNC: 6 G/DL (ref 5.7–8.2)
PROT SERPL-MCNC: 6.2 G/DL (ref 5.7–8.2)
PROT SERPL-MCNC: 6.2 G/DL (ref 5.7–8.2)
PROT SERPL-MCNC: 6.3 G/DL (ref 5.7–8.2)
PROT SERPL-MCNC: 6.8 G/DL (ref 5.7–8.2)
PROT SERPL-MCNC: 6.9 G/DL (ref 5.7–8.2)
PROT UR QL STRIP: ABNORMAL
PROT UR-MCNC: 44 MG/DL (ref 1–14)
PROTEINASE3 AB SER IA-ACNC: <3.5 U/ML (ref 0–3.5)
PROTHROMBIN TIME: 14.3 SECONDS (ref 9.6–11.5)
RBC # BLD AUTO: 1.66 10*6/MM3 (ref 3.89–5.14)
RBC # BLD AUTO: 2.34 10*6/MM3 (ref 3.89–5.14)
RBC # BLD AUTO: 2.78 10*6/MM3 (ref 3.89–5.14)
RBC # BLD AUTO: 3.35 10*6/MM3 (ref 3.89–5.14)
RBC # BLD AUTO: 3.35 10*6/MM3 (ref 3.89–5.14)
RBC # BLD AUTO: 3.39 10*6/MM3 (ref 3.89–5.14)
RBC # UR: ABNORMAL /HPF
RBC MORPH BLD: NORMAL
RBC MORPH BLD: NORMAL
REF LAB TEST METHOD: ABNORMAL
RETICS/RBC NFR AUTO: 2.95 % (ref 0.5–1.5)
RH BLD: NEGATIVE
RH BLD: NEGATIVE
SODIUM BLD-SCNC: 128 MMOL/L (ref 132–146)
SODIUM BLD-SCNC: 129 MMOL/L (ref 132–146)
SODIUM BLD-SCNC: 131 MMOL/L (ref 132–146)
SODIUM BLD-SCNC: 131 MMOL/L (ref 132–146)
SODIUM BLD-SCNC: 132 MMOL/L (ref 132–146)
SODIUM BLD-SCNC: 132 MMOL/L (ref 132–146)
SODIUM BLD-SCNC: 133 MMOL/L (ref 132–146)
SODIUM UR-SCNC: 40 MMOL/L (ref 30–90)
SP GR UR STRIP: 1.01 (ref 1–1.03)
SQUAMOUS #/AREA URNS HPF: ABNORMAL /HPF
STRESS TARGET HR: 127 BPM
T&S EXPIRATION DATE: NORMAL
TIBC SERPL-MCNC: 273 MCG/DL (ref 250–450)
TROPONIN I SERPL-MCNC: 0.02 NG/ML (ref 0–0.07)
TROPONIN I SERPL-MCNC: 0.03 NG/ML
TROPONIN I SERPL-MCNC: 0.12 NG/ML
TSH SERPL DL<=0.05 MIU/L-ACNC: 21.64 MIU/ML (ref 0.35–5.35)
UNIT  ABO: NORMAL
UNIT  RH: NORMAL
URATE SERPL-MCNC: 16.5 MG/DL (ref 3.1–7.8)
UROBILINOGEN UR QL STRIP: ABNORMAL
VIT B12 BLD-MCNC: 653 PG/ML (ref 211–911)
WBC MORPH BLD: NORMAL
WBC MORPH BLD: NORMAL
WBC NRBC COR # BLD: 10.03 10*3/MM3 (ref 3.5–10.8)
WBC NRBC COR # BLD: 11.69 10*3/MM3 (ref 3.5–10.8)
WBC NRBC COR # BLD: 8.61 10*3/MM3 (ref 3.5–10.8)
WBC NRBC COR # BLD: 9.37 10*3/MM3 (ref 3.5–10.8)
WBC NRBC COR # BLD: 9.38 10*3/MM3 (ref 3.5–10.8)
WBC NRBC COR # BLD: 9.41 10*3/MM3 (ref 3.5–10.8)
WBC UR QL AUTO: ABNORMAL /HPF
WHOLE BLOOD HOLD SPECIMEN: NORMAL
WHOLE BLOOD HOLD SPECIMEN: NORMAL

## 2018-01-01 PROCEDURE — 84156 ASSAY OF PROTEIN URINE: CPT | Performed by: INTERNAL MEDICINE

## 2018-01-01 PROCEDURE — 85018 HEMOGLOBIN: CPT | Performed by: INTERNAL MEDICINE

## 2018-01-01 PROCEDURE — 85610 PROTHROMBIN TIME: CPT | Performed by: HOSPITALIST

## 2018-01-01 PROCEDURE — 99231 SBSQ HOSP IP/OBS SF/LOW 25: CPT | Performed by: INTERNAL MEDICINE

## 2018-01-01 PROCEDURE — 25010000002 ONDANSETRON PER 1 MG: Performed by: NURSE PRACTITIONER

## 2018-01-01 PROCEDURE — 83605 ASSAY OF LACTIC ACID: CPT | Performed by: INTERNAL MEDICINE

## 2018-01-01 PROCEDURE — 25010000002 AZITHROMYCIN: Performed by: HOSPITALIST

## 2018-01-01 PROCEDURE — 84100 ASSAY OF PHOSPHORUS: CPT | Performed by: INTERNAL MEDICINE

## 2018-01-01 PROCEDURE — 92610 EVALUATE SWALLOWING FUNCTION: CPT

## 2018-01-01 PROCEDURE — 93306 TTE W/DOPPLER COMPLETE: CPT

## 2018-01-01 PROCEDURE — 0DD68ZX EXTRACTION OF STOMACH, VIA NATURAL OR ARTIFICIAL OPENING ENDOSCOPIC, DIAGNOSTIC: ICD-10-PCS | Performed by: INTERNAL MEDICINE

## 2018-01-01 PROCEDURE — 25010000002 HEPARIN (PORCINE) PER 1000 UNITS: Performed by: HOSPITALIST

## 2018-01-01 PROCEDURE — 99232 SBSQ HOSP IP/OBS MODERATE 35: CPT | Performed by: HOSPITALIST

## 2018-01-01 PROCEDURE — 80053 COMPREHEN METABOLIC PANEL: CPT | Performed by: INTERNAL MEDICINE

## 2018-01-01 PROCEDURE — 82962 GLUCOSE BLOOD TEST: CPT

## 2018-01-01 PROCEDURE — 25010000002 LORAZEPAM PER 2 MG: Performed by: INTERNAL MEDICINE

## 2018-01-01 PROCEDURE — 93010 ELECTROCARDIOGRAM REPORT: CPT | Performed by: INTERNAL MEDICINE

## 2018-01-01 PROCEDURE — 85025 COMPLETE CBC W/AUTO DIFF WBC: CPT | Performed by: NURSE PRACTITIONER

## 2018-01-01 PROCEDURE — 86923 COMPATIBILITY TEST ELECTRIC: CPT

## 2018-01-01 PROCEDURE — 25010000002 MORPHINE SULFATE (PF) 2 MG/ML SOLUTION: Performed by: NURSE PRACTITIONER

## 2018-01-01 PROCEDURE — 25010000002 MORPHINE SULFATE (PF) 2 MG/ML SOLUTION: Performed by: FAMILY MEDICINE

## 2018-01-01 PROCEDURE — 83735 ASSAY OF MAGNESIUM: CPT | Performed by: INTERNAL MEDICINE

## 2018-01-01 PROCEDURE — 86901 BLOOD TYPING SEROLOGIC RH(D): CPT

## 2018-01-01 PROCEDURE — 99232 SBSQ HOSP IP/OBS MODERATE 35: CPT | Performed by: NURSE PRACTITIONER

## 2018-01-01 PROCEDURE — 80053 COMPREHEN METABOLIC PANEL: CPT | Performed by: HOSPITALIST

## 2018-01-01 PROCEDURE — 99232 SBSQ HOSP IP/OBS MODERATE 35: CPT | Performed by: INTERNAL MEDICINE

## 2018-01-01 PROCEDURE — 83735 ASSAY OF MAGNESIUM: CPT | Performed by: NURSE PRACTITIONER

## 2018-01-01 PROCEDURE — 86256 FLUORESCENT ANTIBODY TITER: CPT | Performed by: INTERNAL MEDICINE

## 2018-01-01 PROCEDURE — 82550 ASSAY OF CK (CPK): CPT | Performed by: HOSPITALIST

## 2018-01-01 PROCEDURE — 82570 ASSAY OF URINE CREATININE: CPT | Performed by: INTERNAL MEDICINE

## 2018-01-01 PROCEDURE — 86900 BLOOD TYPING SEROLOGIC ABO: CPT

## 2018-01-01 PROCEDURE — 87086 URINE CULTURE/COLONY COUNT: CPT | Performed by: NURSE PRACTITIONER

## 2018-01-01 PROCEDURE — 25010000002 CEFTRIAXONE PER 250 MG: Performed by: HOSPITALIST

## 2018-01-01 PROCEDURE — 83550 IRON BINDING TEST: CPT | Performed by: INTERNAL MEDICINE

## 2018-01-01 PROCEDURE — 85025 COMPLETE CBC W/AUTO DIFF WBC: CPT | Performed by: INTERNAL MEDICINE

## 2018-01-01 PROCEDURE — 83540 ASSAY OF IRON: CPT | Performed by: INTERNAL MEDICINE

## 2018-01-01 PROCEDURE — P9046 ALBUMIN (HUMAN), 25%, 20 ML: HCPCS | Performed by: NURSE PRACTITIONER

## 2018-01-01 PROCEDURE — 87186 SC STD MICRODIL/AGAR DIL: CPT | Performed by: NURSE PRACTITIONER

## 2018-01-01 PROCEDURE — 63710000001 INSULIN LISPRO (HUMAN) PER 5 UNITS: Performed by: NURSE PRACTITIONER

## 2018-01-01 PROCEDURE — 84484 ASSAY OF TROPONIN QUANT: CPT

## 2018-01-01 PROCEDURE — 83735 ASSAY OF MAGNESIUM: CPT | Performed by: HOSPITALIST

## 2018-01-01 PROCEDURE — 80053 COMPREHEN METABOLIC PANEL: CPT | Performed by: PHYSICIAN ASSISTANT

## 2018-01-01 PROCEDURE — 84100 ASSAY OF PHOSPHORUS: CPT | Performed by: NURSE PRACTITIONER

## 2018-01-01 PROCEDURE — 87077 CULTURE AEROBIC IDENTIFY: CPT | Performed by: NURSE PRACTITIONER

## 2018-01-01 PROCEDURE — 82784 ASSAY IGA/IGD/IGG/IGM EACH: CPT | Performed by: INTERNAL MEDICINE

## 2018-01-01 PROCEDURE — 36430 TRANSFUSION BLD/BLD COMPNT: CPT

## 2018-01-01 PROCEDURE — 84443 ASSAY THYROID STIM HORMONE: CPT | Performed by: NURSE PRACTITIONER

## 2018-01-01 PROCEDURE — P9016 RBC LEUKOCYTES REDUCED: HCPCS

## 2018-01-01 PROCEDURE — 83036 HEMOGLOBIN GLYCOSYLATED A1C: CPT | Performed by: INTERNAL MEDICINE

## 2018-01-01 PROCEDURE — G8979 MOBILITY GOAL STATUS: HCPCS

## 2018-01-01 PROCEDURE — 99291 CRITICAL CARE FIRST HOUR: CPT | Performed by: INTERNAL MEDICINE

## 2018-01-01 PROCEDURE — 25010000002 COSYNTROPIN PER 0.25 MG: Performed by: INTERNAL MEDICINE

## 2018-01-01 PROCEDURE — 87205 SMEAR GRAM STAIN: CPT | Performed by: INTERNAL MEDICINE

## 2018-01-01 PROCEDURE — 83880 ASSAY OF NATRIURETIC PEPTIDE: CPT | Performed by: HOSPITALIST

## 2018-01-01 PROCEDURE — 99233 SBSQ HOSP IP/OBS HIGH 50: CPT | Performed by: HOSPITALIST

## 2018-01-01 PROCEDURE — 99232 SBSQ HOSP IP/OBS MODERATE 35: CPT | Performed by: PHYSICIAN ASSISTANT

## 2018-01-01 PROCEDURE — 25010000002 NEOSTIGMINE 10 MG/10ML SOLUTION: Performed by: NURSE ANESTHETIST, CERTIFIED REGISTERED

## 2018-01-01 PROCEDURE — 97530 THERAPEUTIC ACTIVITIES: CPT | Performed by: OCCUPATIONAL THERAPIST

## 2018-01-01 PROCEDURE — 85014 HEMATOCRIT: CPT | Performed by: INTERNAL MEDICINE

## 2018-01-01 PROCEDURE — 97110 THERAPEUTIC EXERCISES: CPT

## 2018-01-01 PROCEDURE — 99233 SBSQ HOSP IP/OBS HIGH 50: CPT | Performed by: INTERNAL MEDICINE

## 2018-01-01 PROCEDURE — 85025 COMPLETE CBC W/AUTO DIFF WBC: CPT | Performed by: PHYSICIAN ASSISTANT

## 2018-01-01 PROCEDURE — 25010000002 LORAZEPAM PER 2 MG: Performed by: NURSE PRACTITIONER

## 2018-01-01 PROCEDURE — 83520 IMMUNOASSAY QUANT NOS NONAB: CPT | Performed by: INTERNAL MEDICINE

## 2018-01-01 PROCEDURE — 86335 IMMUNFIX E-PHORSIS/URINE/CSF: CPT | Performed by: INTERNAL MEDICINE

## 2018-01-01 PROCEDURE — 97161 PT EVAL LOW COMPLEX 20 MIN: CPT

## 2018-01-01 PROCEDURE — 85007 BL SMEAR W/DIFF WBC COUNT: CPT | Performed by: INTERNAL MEDICINE

## 2018-01-01 PROCEDURE — 83930 ASSAY OF BLOOD OSMOLALITY: CPT | Performed by: INTERNAL MEDICINE

## 2018-01-01 PROCEDURE — 85045 AUTOMATED RETICULOCYTE COUNT: CPT | Performed by: INTERNAL MEDICINE

## 2018-01-01 PROCEDURE — 99239 HOSP IP/OBS DSCHRG MGMT >30: CPT | Performed by: NURSE PRACTITIONER

## 2018-01-01 PROCEDURE — G8978 MOBILITY CURRENT STATUS: HCPCS

## 2018-01-01 PROCEDURE — 84484 ASSAY OF TROPONIN QUANT: CPT | Performed by: INTERNAL MEDICINE

## 2018-01-01 PROCEDURE — 99285 EMERGENCY DEPT VISIT HI MDM: CPT

## 2018-01-01 PROCEDURE — 84484 ASSAY OF TROPONIN QUANT: CPT | Performed by: HOSPITALIST

## 2018-01-01 PROCEDURE — 86038 ANTINUCLEAR ANTIBODIES: CPT | Performed by: INTERNAL MEDICINE

## 2018-01-01 PROCEDURE — 88305 TISSUE EXAM BY PATHOLOGIST: CPT | Performed by: INTERNAL MEDICINE

## 2018-01-01 PROCEDURE — 99222 1ST HOSP IP/OBS MODERATE 55: CPT | Performed by: PHYSICIAN ASSISTANT

## 2018-01-01 PROCEDURE — 85007 BL SMEAR W/DIFF WBC COUNT: CPT | Performed by: NURSE PRACTITIONER

## 2018-01-01 PROCEDURE — 25010000002 PROPOFOL 10 MG/ML EMULSION: Performed by: NURSE ANESTHETIST, CERTIFIED REGISTERED

## 2018-01-01 PROCEDURE — 86850 RBC ANTIBODY SCREEN: CPT

## 2018-01-01 PROCEDURE — 80048 BASIC METABOLIC PNL TOTAL CA: CPT | Performed by: NURSE PRACTITIONER

## 2018-01-01 PROCEDURE — 86334 IMMUNOFIX E-PHORESIS SERUM: CPT | Performed by: INTERNAL MEDICINE

## 2018-01-01 PROCEDURE — 76775 US EXAM ABDO BACK WALL LIM: CPT

## 2018-01-01 PROCEDURE — 93005 ELECTROCARDIOGRAM TRACING: CPT | Performed by: NURSE PRACTITIONER

## 2018-01-01 PROCEDURE — 82270 OCCULT BLOOD FECES: CPT | Performed by: PHYSICIAN ASSISTANT

## 2018-01-01 PROCEDURE — 82330 ASSAY OF CALCIUM: CPT | Performed by: NURSE PRACTITIONER

## 2018-01-01 PROCEDURE — 83605 ASSAY OF LACTIC ACID: CPT | Performed by: NURSE PRACTITIONER

## 2018-01-01 PROCEDURE — 83935 ASSAY OF URINE OSMOLALITY: CPT | Performed by: INTERNAL MEDICINE

## 2018-01-01 PROCEDURE — 71045 X-RAY EXAM CHEST 1 VIEW: CPT

## 2018-01-01 PROCEDURE — 83880 ASSAY OF NATRIURETIC PEPTIDE: CPT | Performed by: INTERNAL MEDICINE

## 2018-01-01 PROCEDURE — P9040 RBC LEUKOREDUCED IRRADIATED: HCPCS

## 2018-01-01 PROCEDURE — 82607 VITAMIN B-12: CPT | Performed by: INTERNAL MEDICINE

## 2018-01-01 PROCEDURE — 81001 URINALYSIS AUTO W/SCOPE: CPT | Performed by: NURSE PRACTITIONER

## 2018-01-01 PROCEDURE — 97165 OT EVAL LOW COMPLEX 30 MIN: CPT

## 2018-01-01 PROCEDURE — 84550 ASSAY OF BLOOD/URIC ACID: CPT | Performed by: INTERNAL MEDICINE

## 2018-01-01 PROCEDURE — 99221 1ST HOSP IP/OBS SF/LOW 40: CPT | Performed by: INTERNAL MEDICINE

## 2018-01-01 PROCEDURE — 84300 ASSAY OF URINE SODIUM: CPT | Performed by: INTERNAL MEDICINE

## 2018-01-01 PROCEDURE — 82533 TOTAL CORTISOL: CPT | Performed by: INTERNAL MEDICINE

## 2018-01-01 PROCEDURE — 84100 ASSAY OF PHOSPHORUS: CPT | Performed by: HOSPITALIST

## 2018-01-01 PROCEDURE — 25010000002 ALBUMIN HUMAN 25% PER 50 ML: Performed by: NURSE PRACTITIONER

## 2018-01-01 PROCEDURE — 25010000002 DESMOPRESSIN PER 1 MCG: Performed by: INTERNAL MEDICINE

## 2018-01-01 RX ORDER — HEPARIN SODIUM 5000 [USP'U]/ML
5000 INJECTION, SOLUTION INTRAVENOUS; SUBCUTANEOUS EVERY 8 HOURS SCHEDULED
Status: DISCONTINUED | OUTPATIENT
Start: 2018-01-01 | End: 2018-01-01

## 2018-01-01 RX ORDER — LOPERAMIDE HYDROCHLORIDE 2 MG/1
2 CAPSULE ORAL 4 TIMES DAILY PRN
COMMUNITY

## 2018-01-01 RX ORDER — HEPARIN SODIUM 1000 [USP'U]/ML
5000 INJECTION, SOLUTION INTRAVENOUS; SUBCUTANEOUS EVERY 8 HOURS
COMMUNITY

## 2018-01-01 RX ORDER — GLYCOPYRROLATE 0.2 MG/ML
0.2 INJECTION INTRAMUSCULAR; INTRAVENOUS
Status: DISCONTINUED | OUTPATIENT
Start: 2018-01-01 | End: 2018-01-01 | Stop reason: HOSPADM

## 2018-01-01 RX ORDER — SODIUM CHLORIDE 0.9 % (FLUSH) 0.9 %
10 SYRINGE (ML) INJECTION AS NEEDED
Status: CANCELLED | OUTPATIENT
Start: 2018-01-01

## 2018-01-01 RX ORDER — ONDANSETRON 2 MG/ML
4 INJECTION INTRAMUSCULAR; INTRAVENOUS EVERY 6 HOURS PRN
Status: CANCELLED | OUTPATIENT
Start: 2018-01-01

## 2018-01-01 RX ORDER — PANTOPRAZOLE SODIUM 40 MG/1
40 TABLET, DELAYED RELEASE ORAL
Status: DISCONTINUED | OUTPATIENT
Start: 2018-01-01 | End: 2018-01-01

## 2018-01-01 RX ORDER — MORPHINE SULFATE 2 MG/ML
1 INJECTION, SOLUTION INTRAMUSCULAR; INTRAVENOUS
Status: DISCONTINUED | OUTPATIENT
Start: 2018-01-01 | End: 2018-01-01

## 2018-01-01 RX ORDER — CALCIUM CARBONATE 200(500)MG
1 TABLET,CHEWABLE ORAL 3 TIMES DAILY PRN
COMMUNITY

## 2018-01-01 RX ORDER — SODIUM CHLORIDE 0.9 % (FLUSH) 0.9 %
10 SYRINGE (ML) INJECTION AS NEEDED
Status: DISCONTINUED | OUTPATIENT
Start: 2018-01-01 | End: 2018-01-01 | Stop reason: HOSPADM

## 2018-01-01 RX ORDER — MORPHINE SULFATE 2 MG/ML
1 INJECTION, SOLUTION INTRAMUSCULAR; INTRAVENOUS
Status: DISCONTINUED | OUTPATIENT
Start: 2018-01-01 | End: 2018-01-01 | Stop reason: CLARIF

## 2018-01-01 RX ORDER — MORPHINE SULFATE 2 MG/ML
1 INJECTION, SOLUTION INTRAMUSCULAR; INTRAVENOUS EVERY 6 HOURS
Status: CANCELLED | OUTPATIENT
Start: 2018-01-01 | End: 2018-06-16

## 2018-01-01 RX ORDER — MORPHINE SULFATE 2 MG/ML
1 INJECTION, SOLUTION INTRAMUSCULAR; INTRAVENOUS EVERY 6 HOURS
Status: DISCONTINUED | OUTPATIENT
Start: 2018-01-01 | End: 2018-01-01 | Stop reason: HOSPADM

## 2018-01-01 RX ORDER — ISOSORBIDE DINITRATE 20 MG/1
20 TABLET ORAL 3 TIMES DAILY
COMMUNITY

## 2018-01-01 RX ORDER — LIDOCAINE HYDROCHLORIDE 10 MG/ML
INJECTION, SOLUTION INFILTRATION; PERINEURAL AS NEEDED
Status: DISCONTINUED | OUTPATIENT
Start: 2018-01-01 | End: 2018-01-01 | Stop reason: SURG

## 2018-01-01 RX ORDER — ACETAMINOPHEN 650 MG
TABLET, EXTENDED RELEASE ORAL DAILY
Status: CANCELLED | OUTPATIENT
Start: 2018-01-01

## 2018-01-01 RX ORDER — PANTOPRAZOLE SODIUM 40 MG/1
40 TABLET, DELAYED RELEASE ORAL 2 TIMES DAILY
COMMUNITY

## 2018-01-01 RX ORDER — ACETAMINOPHEN 650 MG/1
650 SUPPOSITORY RECTAL EVERY 4 HOURS PRN
Status: DISCONTINUED | OUTPATIENT
Start: 2018-01-01 | End: 2018-01-01 | Stop reason: HOSPADM

## 2018-01-01 RX ORDER — LORAZEPAM 2 MG/ML
0.25 INJECTION INTRAMUSCULAR EVERY 4 HOURS PRN
Status: CANCELLED | OUTPATIENT
Start: 2018-01-01 | End: 2018-01-01

## 2018-01-01 RX ORDER — NICOTINE POLACRILEX 4 MG
15 LOZENGE BUCCAL
Status: DISCONTINUED | OUTPATIENT
Start: 2018-01-01 | End: 2018-01-01

## 2018-01-01 RX ORDER — LORAZEPAM 2 MG/ML
0.25 INJECTION INTRAMUSCULAR EVERY 4 HOURS PRN
Status: DISCONTINUED | OUTPATIENT
Start: 2018-01-01 | End: 2018-01-01

## 2018-01-01 RX ORDER — DEXTROSE MONOHYDRATE 25 G/50ML
25 INJECTION, SOLUTION INTRAVENOUS
Status: DISCONTINUED | OUTPATIENT
Start: 2018-01-01 | End: 2018-01-01

## 2018-01-01 RX ORDER — MORPHINE SULFATE 2 MG/ML
1 INJECTION, SOLUTION INTRAMUSCULAR; INTRAVENOUS
Status: CANCELLED | OUTPATIENT
Start: 2018-01-01 | End: 2018-06-14

## 2018-01-01 RX ORDER — POLYVINYL ALCOHOL 14 MG/ML
1 SOLUTION/ DROPS OPHTHALMIC
Status: DISCONTINUED | OUTPATIENT
Start: 2018-01-01 | End: 2018-01-01 | Stop reason: HOSPADM

## 2018-01-01 RX ORDER — TRAMADOL HYDROCHLORIDE 50 MG/1
25 TABLET ORAL EVERY 8 HOURS PRN
Status: DISCONTINUED | OUTPATIENT
Start: 2018-01-01 | End: 2018-01-01

## 2018-01-01 RX ORDER — ALBUMIN (HUMAN) 12.5 G/50ML
50 SOLUTION INTRAVENOUS ONCE
Status: COMPLETED | OUTPATIENT
Start: 2018-01-01 | End: 2018-01-01

## 2018-01-01 RX ORDER — MORPHINE SULFATE 2 MG/ML
2 INJECTION, SOLUTION INTRAMUSCULAR; INTRAVENOUS
Status: DISCONTINUED | OUTPATIENT
Start: 2018-01-01 | End: 2018-01-01 | Stop reason: HOSPADM

## 2018-01-01 RX ORDER — BUMETANIDE 2 MG/1
4 TABLET ORAL 2 TIMES DAILY
COMMUNITY

## 2018-01-01 RX ORDER — ACETAMINOPHEN 325 MG/1
650 TABLET ORAL EVERY 4 HOURS PRN
Status: DISCONTINUED | OUTPATIENT
Start: 2018-01-01 | End: 2018-01-01 | Stop reason: HOSPADM

## 2018-01-01 RX ORDER — ACETAMINOPHEN 325 MG/1
650 TABLET ORAL EVERY 6 HOURS PRN
COMMUNITY

## 2018-01-01 RX ORDER — GLYCOPYRROLATE 0.2 MG/ML
0.4 INJECTION INTRAMUSCULAR; INTRAVENOUS EVERY 6 HOURS PRN
Status: DISCONTINUED | OUTPATIENT
Start: 2018-01-01 | End: 2018-01-01 | Stop reason: HOSPADM

## 2018-01-01 RX ORDER — NEOSTIGMINE METHYLSULFATE 1 MG/ML
INJECTION, SOLUTION INTRAVENOUS AS NEEDED
Status: DISCONTINUED | OUTPATIENT
Start: 2018-01-01 | End: 2018-01-01 | Stop reason: SURG

## 2018-01-01 RX ORDER — ATRACURIUM BESYLATE 10 MG/ML
INJECTION, SOLUTION INTRAVENOUS AS NEEDED
Status: DISCONTINUED | OUTPATIENT
Start: 2018-01-01 | End: 2018-01-01 | Stop reason: SURG

## 2018-01-01 RX ORDER — SODIUM CHLORIDE 9 MG/ML
30 INJECTION, SOLUTION INTRAVENOUS CONTINUOUS
Status: DISCONTINUED | OUTPATIENT
Start: 2018-01-01 | End: 2018-01-01

## 2018-01-01 RX ORDER — POLYVINYL ALCOHOL 14 MG/ML
1 SOLUTION/ DROPS OPHTHALMIC
Status: CANCELLED | OUTPATIENT
Start: 2018-01-01

## 2018-01-01 RX ORDER — KETOROLAC TROMETHAMINE 30 MG/ML
15 INJECTION, SOLUTION INTRAMUSCULAR; INTRAVENOUS EVERY 6 HOURS PRN
Status: DISCONTINUED | OUTPATIENT
Start: 2018-01-01 | End: 2018-01-01

## 2018-01-01 RX ORDER — BISACODYL 10 MG
10 SUPPOSITORY, RECTAL RECTAL DAILY PRN
Status: CANCELLED | OUTPATIENT
Start: 2018-01-01

## 2018-01-01 RX ORDER — GLYCOPYRROLATE 0.2 MG/ML
0.2 INJECTION INTRAMUSCULAR; INTRAVENOUS
Status: CANCELLED | OUTPATIENT
Start: 2018-01-01

## 2018-01-01 RX ORDER — ACETAMINOPHEN 650 MG
TABLET, EXTENDED RELEASE ORAL DAILY
Status: DISCONTINUED | OUTPATIENT
Start: 2018-01-01 | End: 2018-01-01 | Stop reason: HOSPADM

## 2018-01-01 RX ORDER — ACETAMINOPHEN 325 MG/1
650 TABLET ORAL EVERY 4 HOURS PRN
Status: CANCELLED | OUTPATIENT
Start: 2018-01-01

## 2018-01-01 RX ORDER — BISACODYL 10 MG
10 SUPPOSITORY, RECTAL RECTAL DAILY PRN
Status: DISCONTINUED | OUTPATIENT
Start: 2018-01-01 | End: 2018-01-01 | Stop reason: HOSPADM

## 2018-01-01 RX ORDER — HYDROXYZINE HYDROCHLORIDE 10 MG/1
10 TABLET, FILM COATED ORAL 2 TIMES DAILY PRN
Status: CANCELLED | OUTPATIENT
Start: 2018-01-01

## 2018-01-01 RX ORDER — HALOPERIDOL 5 MG/ML
1 INJECTION INTRAMUSCULAR EVERY 4 HOURS PRN
Status: DISCONTINUED | OUTPATIENT
Start: 2018-01-01 | End: 2018-01-01 | Stop reason: HOSPADM

## 2018-01-01 RX ORDER — ALLOPURINOL 100 MG/1
100 TABLET ORAL DAILY
Status: DISCONTINUED | OUTPATIENT
Start: 2018-01-01 | End: 2018-01-01

## 2018-01-01 RX ORDER — SENNA AND DOCUSATE SODIUM 50; 8.6 MG/1; MG/1
2 TABLET, FILM COATED ORAL NIGHTLY
Status: DISCONTINUED | OUTPATIENT
Start: 2018-01-01 | End: 2018-01-01

## 2018-01-01 RX ORDER — PANTOPRAZOLE SODIUM 40 MG/10ML
80 INJECTION, POWDER, LYOPHILIZED, FOR SOLUTION INTRAVENOUS ONCE
Status: DISCONTINUED | OUTPATIENT
Start: 2018-01-01 | End: 2018-01-01

## 2018-01-01 RX ORDER — PROPOFOL 10 MG/ML
VIAL (ML) INTRAVENOUS AS NEEDED
Status: DISCONTINUED | OUTPATIENT
Start: 2018-01-01 | End: 2018-01-01 | Stop reason: SURG

## 2018-01-01 RX ORDER — MORPHINE SULFATE 2 MG/ML
4 INJECTION, SOLUTION INTRAMUSCULAR; INTRAVENOUS
Status: DISCONTINUED | OUTPATIENT
Start: 2018-01-01 | End: 2018-01-01 | Stop reason: HOSPADM

## 2018-01-01 RX ORDER — SODIUM CHLORIDE 0.9 % (FLUSH) 0.9 %
1-10 SYRINGE (ML) INJECTION AS NEEDED
Status: DISCONTINUED | OUTPATIENT
Start: 2018-01-01 | End: 2018-01-01 | Stop reason: HOSPADM

## 2018-01-01 RX ORDER — ACETAMINOPHEN 650 MG/1
650 SUPPOSITORY RECTAL EVERY 4 HOURS PRN
Status: CANCELLED | OUTPATIENT
Start: 2018-01-01

## 2018-01-01 RX ORDER — ONDANSETRON 2 MG/ML
4 INJECTION INTRAMUSCULAR; INTRAVENOUS EVERY 6 HOURS PRN
Status: DISCONTINUED | OUTPATIENT
Start: 2018-01-01 | End: 2018-01-01 | Stop reason: HOSPADM

## 2018-01-01 RX ORDER — LEVOTHYROXINE SODIUM 0.07 MG/1
75 TABLET ORAL DAILY
Status: DISCONTINUED | OUTPATIENT
Start: 2018-01-01 | End: 2018-01-01

## 2018-01-01 RX ORDER — MORPHINE SULFATE 2 MG/ML
1 INJECTION, SOLUTION INTRAMUSCULAR; INTRAVENOUS
Status: DISCONTINUED | OUTPATIENT
Start: 2018-01-01 | End: 2018-01-01 | Stop reason: HOSPADM

## 2018-01-01 RX ORDER — MIRTAZAPINE 15 MG/1
7.5 TABLET, FILM COATED ORAL NIGHTLY
COMMUNITY

## 2018-01-01 RX ORDER — LORAZEPAM 2 MG/ML
0.5 INJECTION INTRAMUSCULAR EVERY 4 HOURS PRN
Status: DISCONTINUED | OUTPATIENT
Start: 2018-01-01 | End: 2018-01-01 | Stop reason: HOSPADM

## 2018-01-01 RX ORDER — SODIUM CHLORIDE, SODIUM LACTATE, POTASSIUM CHLORIDE, CALCIUM CHLORIDE 600; 310; 30; 20 MG/100ML; MG/100ML; MG/100ML; MG/100ML
500 INJECTION, SOLUTION INTRAVENOUS ONCE
Status: COMPLETED | OUTPATIENT
Start: 2018-01-01 | End: 2018-01-01

## 2018-01-01 RX ORDER — CEFTRIAXONE SODIUM 1 G/50ML
1 INJECTION, SOLUTION INTRAVENOUS
Status: DISCONTINUED | OUTPATIENT
Start: 2018-01-01 | End: 2018-01-01

## 2018-01-01 RX ORDER — LORAZEPAM 2 MG/ML
0.25 INJECTION INTRAMUSCULAR EVERY 4 HOURS PRN
Status: DISCONTINUED | OUTPATIENT
Start: 2018-01-01 | End: 2018-01-01 | Stop reason: HOSPADM

## 2018-01-01 RX ORDER — LEVOTHYROXINE SODIUM 0.03 MG/1
25 TABLET ORAL DAILY
Status: DISCONTINUED | OUTPATIENT
Start: 2018-01-01 | End: 2018-01-01

## 2018-01-01 RX ORDER — MIDODRINE HYDROCHLORIDE 5 MG/1
10 TABLET ORAL EVERY 8 HOURS
Status: DISCONTINUED | OUTPATIENT
Start: 2018-01-01 | End: 2018-01-01

## 2018-01-01 RX ORDER — BISACODYL 10 MG
10 SUPPOSITORY, RECTAL RECTAL DAILY
Status: DISCONTINUED | OUTPATIENT
Start: 2018-01-01 | End: 2018-01-01 | Stop reason: HOSPADM

## 2018-01-01 RX ORDER — SODIUM CHLORIDE, SODIUM LACTATE, POTASSIUM CHLORIDE, CALCIUM CHLORIDE 600; 310; 30; 20 MG/100ML; MG/100ML; MG/100ML; MG/100ML
100 INJECTION, SOLUTION INTRAVENOUS CONTINUOUS
Status: ACTIVE | OUTPATIENT
Start: 2018-01-01 | End: 2018-01-01

## 2018-01-01 RX ORDER — BISACODYL 10 MG
10 SUPPOSITORY, RECTAL RECTAL DAILY PRN
Status: DISCONTINUED | OUTPATIENT
Start: 2018-01-01 | End: 2018-01-01

## 2018-01-01 RX ORDER — LEVOTHYROXINE SODIUM 0.03 MG/1
25 TABLET ORAL DAILY
COMMUNITY

## 2018-01-01 RX ORDER — SODIUM CHLORIDE 9 MG/ML
INJECTION, SOLUTION INTRAVENOUS CONTINUOUS PRN
Status: DISCONTINUED | OUTPATIENT
Start: 2018-01-01 | End: 2018-01-01 | Stop reason: SURG

## 2018-01-01 RX ORDER — COSYNTROPIN 0.25 MG/ML
0.25 INJECTION, POWDER, FOR SOLUTION INTRAMUSCULAR; INTRAVENOUS ONCE
Status: COMPLETED | OUTPATIENT
Start: 2018-01-01 | End: 2018-01-01

## 2018-01-01 RX ORDER — HYDROXYZINE HYDROCHLORIDE 10 MG/1
10 TABLET, FILM COATED ORAL 2 TIMES DAILY PRN
Status: DISCONTINUED | OUTPATIENT
Start: 2018-01-01 | End: 2018-01-01 | Stop reason: HOSPADM

## 2018-01-01 RX ORDER — METOLAZONE 10 MG/1
10 TABLET ORAL DAILY
COMMUNITY

## 2018-01-01 RX ORDER — MORPHINE SULFATE 2 MG/ML
2 INJECTION, SOLUTION INTRAMUSCULAR; INTRAVENOUS
Status: DISCONTINUED | OUTPATIENT
Start: 2018-01-01 | End: 2018-01-01

## 2018-01-01 RX ORDER — PANTOPRAZOLE SODIUM 40 MG/10ML
40 INJECTION, POWDER, LYOPHILIZED, FOR SOLUTION INTRAVENOUS EVERY 12 HOURS SCHEDULED
Status: DISCONTINUED | OUTPATIENT
Start: 2018-01-01 | End: 2018-01-01

## 2018-01-01 RX ORDER — GLYCOPYRROLATE 0.2 MG/ML
INJECTION INTRAMUSCULAR; INTRAVENOUS AS NEEDED
Status: DISCONTINUED | OUTPATIENT
Start: 2018-01-01 | End: 2018-01-01 | Stop reason: SURG

## 2018-01-01 RX ORDER — SODIUM CHLORIDE 0.9 % (FLUSH) 0.9 %
1-10 SYRINGE (ML) INJECTION AS NEEDED
Status: CANCELLED | OUTPATIENT
Start: 2018-01-01

## 2018-01-01 RX ADMIN — MORPHINE SULFATE 1 MG: 2 INJECTION, SOLUTION INTRAMUSCULAR; INTRAVENOUS at 23:32

## 2018-01-01 RX ADMIN — MIDODRINE HYDROCHLORIDE 10 MG: 5 TABLET ORAL at 02:26

## 2018-01-01 RX ADMIN — LEVOTHYROXINE SODIUM 75 MCG: 75 TABLET ORAL at 06:29

## 2018-01-01 RX ADMIN — MORPHINE SULFATE 1 MG: 10 INJECTION INTRAVENOUS at 22:23

## 2018-01-01 RX ADMIN — MIDODRINE HYDROCHLORIDE 10 MG: 5 TABLET ORAL at 18:09

## 2018-01-01 RX ADMIN — ALLOPURINOL 100 MG: 100 TABLET ORAL at 08:40

## 2018-01-01 RX ADMIN — INSULIN LISPRO 2 UNITS: 100 INJECTION, SOLUTION INTRAVENOUS; SUBCUTANEOUS at 11:52

## 2018-01-01 RX ADMIN — MORPHINE SULFATE 1 MG: 2 INJECTION, SOLUTION INTRAMUSCULAR; INTRAVENOUS at 06:01

## 2018-01-01 RX ADMIN — ONDANSETRON 4 MG: 2 INJECTION INTRAMUSCULAR; INTRAVENOUS at 09:45

## 2018-01-01 RX ADMIN — INSULIN LISPRO 2 UNITS: 100 INJECTION, SOLUTION INTRAVENOUS; SUBCUTANEOUS at 21:28

## 2018-01-01 RX ADMIN — HEPARIN SODIUM 5000 UNITS: 5000 INJECTION, SOLUTION INTRAVENOUS; SUBCUTANEOUS at 16:13

## 2018-01-01 RX ADMIN — SODIUM CHLORIDE, POTASSIUM CHLORIDE, SODIUM LACTATE AND CALCIUM CHLORIDE 500 ML/HR: 600; 310; 30; 20 INJECTION, SOLUTION INTRAVENOUS at 09:09

## 2018-01-01 RX ADMIN — MIDODRINE HYDROCHLORIDE 10 MG: 5 TABLET ORAL at 11:51

## 2018-01-01 RX ADMIN — INSULIN LISPRO 3 UNITS: 100 INJECTION, SOLUTION INTRAVENOUS; SUBCUTANEOUS at 19:24

## 2018-01-01 RX ADMIN — HYDROGEN PEROXIDE: 3 LIQUID TOPICAL at 17:46

## 2018-01-01 RX ADMIN — HYDROGEN PEROXIDE: 3 LIQUID TOPICAL at 05:12

## 2018-01-01 RX ADMIN — HYDROGEN PEROXIDE: 3 LIQUID TOPICAL at 12:12

## 2018-01-01 RX ADMIN — HYDROGEN PEROXIDE: 3 LIQUID TOPICAL at 13:46

## 2018-01-01 RX ADMIN — HEPARIN SODIUM 5000 UNITS: 5000 INJECTION, SOLUTION INTRAVENOUS; SUBCUTANEOUS at 06:28

## 2018-01-01 RX ADMIN — HYDROGEN PEROXIDE: 3 LIQUID TOPICAL at 12:34

## 2018-01-01 RX ADMIN — ONDANSETRON 4 MG: 2 INJECTION INTRAMUSCULAR; INTRAVENOUS at 23:14

## 2018-01-01 RX ADMIN — HEPARIN SODIUM 5000 UNITS: 5000 INJECTION, SOLUTION INTRAVENOUS; SUBCUTANEOUS at 05:49

## 2018-01-01 RX ADMIN — POVIDONE-IODINE: 10 SOLUTION TOPICAL at 09:00

## 2018-01-01 RX ADMIN — LEVOTHYROXINE SODIUM 75 MCG: 75 TABLET ORAL at 05:02

## 2018-01-01 RX ADMIN — HEPARIN SODIUM 5000 UNITS: 5000 INJECTION, SOLUTION INTRAVENOUS; SUBCUTANEOUS at 21:28

## 2018-01-01 RX ADMIN — MORPHINE SULFATE 1 MG: 2 INJECTION, SOLUTION INTRAMUSCULAR; INTRAVENOUS at 02:16

## 2018-01-01 RX ADMIN — SODIUM CHLORIDE 8 MG/HR: 9 INJECTION, SOLUTION INTRAVENOUS at 06:22

## 2018-01-01 RX ADMIN — SODIUM CHLORIDE 8 MG/HR: 9 INJECTION, SOLUTION INTRAVENOUS at 06:00

## 2018-01-01 RX ADMIN — MORPHINE SULFATE 1 MG: 2 INJECTION, SOLUTION INTRAMUSCULAR; INTRAVENOUS at 11:53

## 2018-01-01 RX ADMIN — LEVOTHYROXINE SODIUM 75 MCG: 75 TABLET ORAL at 06:28

## 2018-01-01 RX ADMIN — SODIUM CHLORIDE: 9 INJECTION, SOLUTION INTRAVENOUS at 14:28

## 2018-01-01 RX ADMIN — INSULIN LISPRO 2 UNITS: 100 INJECTION, SOLUTION INTRAVENOUS; SUBCUTANEOUS at 07:01

## 2018-01-01 RX ADMIN — Medication 0.02 MCG/KG/MIN: at 09:06

## 2018-01-01 RX ADMIN — POVIDONE-IODINE: 10 SOLUTION TOPICAL at 08:26

## 2018-01-01 RX ADMIN — ACETAMINOPHEN 650 MG: 325 TABLET, FILM COATED ORAL at 13:52

## 2018-01-01 RX ADMIN — MORPHINE SULFATE 1 MG: 10 INJECTION INTRAVENOUS at 09:26

## 2018-01-01 RX ADMIN — HYDROGEN PEROXIDE: 3 LIQUID TOPICAL at 17:48

## 2018-01-01 RX ADMIN — PANTOPRAZOLE SODIUM 40 MG: 40 TABLET, DELAYED RELEASE ORAL at 06:28

## 2018-01-01 RX ADMIN — MORPHINE SULFATE 1 MG: 2 INJECTION, SOLUTION INTRAMUSCULAR; INTRAVENOUS at 05:53

## 2018-01-01 RX ADMIN — CEFEPIME HYDROCHLORIDE 1 G: 1 INJECTION, POWDER, FOR SOLUTION INTRAMUSCULAR; INTRAVENOUS at 12:11

## 2018-01-01 RX ADMIN — LEVOTHYROXINE SODIUM 25 MCG: 25 TABLET ORAL at 05:26

## 2018-01-01 RX ADMIN — DESMOPRESSIN ACETATE 20 MCG: 4 SOLUTION INTRAVENOUS at 11:11

## 2018-01-01 RX ADMIN — MORPHINE SULFATE 1 MG: 10 INJECTION INTRAVENOUS at 17:53

## 2018-01-01 RX ADMIN — MORPHINE SULFATE 1 MG: 2 INJECTION, SOLUTION INTRAMUSCULAR; INTRAVENOUS at 13:45

## 2018-01-01 RX ADMIN — LIDOCAINE HYDROCHLORIDE 40 MG: 10 INJECTION, SOLUTION INFILTRATION; PERINEURAL at 14:28

## 2018-01-01 RX ADMIN — PANTOPRAZOLE SODIUM 40 MG: 40 TABLET, DELAYED RELEASE ORAL at 05:49

## 2018-01-01 RX ADMIN — MORPHINE SULFATE 1 MG: 2 INJECTION, SOLUTION INTRAMUSCULAR; INTRAVENOUS at 17:47

## 2018-01-01 RX ADMIN — MORPHINE SULFATE 1 MG: 2 INJECTION, SOLUTION INTRAMUSCULAR; INTRAVENOUS at 21:54

## 2018-01-01 RX ADMIN — ACETAMINOPHEN 650 MG: 325 TABLET, FILM COATED ORAL at 15:56

## 2018-01-01 RX ADMIN — ROBINUL 0.4 MG: 0.2 INJECTION INTRAMUSCULAR; INTRAVENOUS at 00:42

## 2018-01-01 RX ADMIN — MIDODRINE HYDROCHLORIDE 10 MG: 5 TABLET ORAL at 11:27

## 2018-01-01 RX ADMIN — SODIUM CHLORIDE 8 MG/HR: 9 INJECTION, SOLUTION INTRAVENOUS at 01:02

## 2018-01-01 RX ADMIN — CEFEPIME HYDROCHLORIDE 1 G: 1 INJECTION, POWDER, FOR SOLUTION INTRAMUSCULAR; INTRAVENOUS at 11:53

## 2018-01-01 RX ADMIN — HEPARIN SODIUM 5000 UNITS: 5000 INJECTION, SOLUTION INTRAVENOUS; SUBCUTANEOUS at 20:36

## 2018-01-01 RX ADMIN — HYDROGEN PEROXIDE: 3 LIQUID TOPICAL at 05:00

## 2018-01-01 RX ADMIN — MIDODRINE HYDROCHLORIDE 10 MG: 5 TABLET ORAL at 17:33

## 2018-01-01 RX ADMIN — Medication 10 ML: at 02:16

## 2018-01-01 RX ADMIN — MIDODRINE HYDROCHLORIDE 10 MG: 5 TABLET ORAL at 17:27

## 2018-01-01 RX ADMIN — MORPHINE SULFATE 1 MG: 2 INJECTION, SOLUTION INTRAMUSCULAR; INTRAVENOUS at 17:28

## 2018-01-01 RX ADMIN — GLYCOPYRROLATE 0.4 MG: 0.2 INJECTION, SOLUTION INTRAMUSCULAR; INTRAVENOUS at 14:36

## 2018-01-01 RX ADMIN — ACETAMINOPHEN 650 MG: 325 TABLET, FILM COATED ORAL at 03:38

## 2018-01-01 RX ADMIN — MORPHINE SULFATE 1 MG: 2 INJECTION, SOLUTION INTRAMUSCULAR; INTRAVENOUS at 17:42

## 2018-01-01 RX ADMIN — MIDODRINE HYDROCHLORIDE 10 MG: 5 TABLET ORAL at 10:44

## 2018-01-01 RX ADMIN — MIDODRINE HYDROCHLORIDE 10 MG: 5 TABLET ORAL at 02:51

## 2018-01-01 RX ADMIN — HYDROGEN PEROXIDE: 3 LIQUID TOPICAL at 23:32

## 2018-01-01 RX ADMIN — SODIUM CHLORIDE, POTASSIUM CHLORIDE, SODIUM LACTATE AND CALCIUM CHLORIDE 500 ML: 600; 310; 30; 20 INJECTION, SOLUTION INTRAVENOUS at 08:52

## 2018-01-01 RX ADMIN — MORPHINE SULFATE 1 MG: 2 INJECTION, SOLUTION INTRAMUSCULAR; INTRAVENOUS at 08:56

## 2018-01-01 RX ADMIN — PANTOPRAZOLE SODIUM 40 MG: 40 TABLET, DELAYED RELEASE ORAL at 08:14

## 2018-01-01 RX ADMIN — MIDODRINE HYDROCHLORIDE 10 MG: 5 TABLET ORAL at 09:38

## 2018-01-01 RX ADMIN — HYDROGEN PEROXIDE: 3 LIQUID TOPICAL at 12:09

## 2018-01-01 RX ADMIN — PANTOPRAZOLE SODIUM 40 MG: 40 INJECTION, POWDER, FOR SOLUTION INTRAVENOUS at 10:36

## 2018-01-01 RX ADMIN — SODIUM CHLORIDE 8 MG/HR: 9 INJECTION, SOLUTION INTRAVENOUS at 02:21

## 2018-01-01 RX ADMIN — COSYNTROPIN 0.25 MG: 0.25 INJECTION, POWDER, LYOPHILIZED, FOR SOLUTION INTRAMUSCULAR; INTRAVENOUS at 11:11

## 2018-01-01 RX ADMIN — PANTOPRAZOLE SODIUM 40 MG: 40 TABLET, DELAYED RELEASE ORAL at 17:27

## 2018-01-01 RX ADMIN — HYDROGEN PEROXIDE: 3 LIQUID TOPICAL at 14:52

## 2018-01-01 RX ADMIN — TRAMADOL HYDROCHLORIDE 25 MG: 50 TABLET, COATED ORAL at 05:14

## 2018-01-01 RX ADMIN — HEPARIN SODIUM 5000 UNITS: 5000 INJECTION, SOLUTION INTRAVENOUS; SUBCUTANEOUS at 05:02

## 2018-01-01 RX ADMIN — MORPHINE SULFATE 1 MG: 2 INJECTION, SOLUTION INTRAMUSCULAR; INTRAVENOUS at 17:33

## 2018-01-01 RX ADMIN — HYDROGEN PEROXIDE: 3 LIQUID TOPICAL at 06:26

## 2018-01-01 RX ADMIN — PROPOFOL 100 MG: 10 INJECTION, EMULSION INTRAVENOUS at 14:28

## 2018-01-01 RX ADMIN — HYDROGEN PEROXIDE: 3 LIQUID TOPICAL at 14:40

## 2018-01-01 RX ADMIN — HYDROGEN PEROXIDE: 3 LIQUID TOPICAL at 12:36

## 2018-01-01 RX ADMIN — Medication 5 MG: at 21:16

## 2018-01-01 RX ADMIN — MORPHINE SULFATE 1 MG: 2 INJECTION, SOLUTION INTRAMUSCULAR; INTRAVENOUS at 05:00

## 2018-01-01 RX ADMIN — BISACODYL 10 MG: 10 SUPPOSITORY RECTAL at 08:57

## 2018-01-01 RX ADMIN — SODIUM CHLORIDE 8 MG/HR: 9 INJECTION, SOLUTION INTRAVENOUS at 11:11

## 2018-01-01 RX ADMIN — MIDODRINE HYDROCHLORIDE 10 MG: 5 TABLET ORAL at 17:52

## 2018-01-01 RX ADMIN — POVIDONE-IODINE: 10 SOLUTION TOPICAL at 09:34

## 2018-01-01 RX ADMIN — LEVOTHYROXINE SODIUM 75 MCG: 75 TABLET ORAL at 05:38

## 2018-01-01 RX ADMIN — POVIDONE-IODINE: 10 SOLUTION TOPICAL at 08:54

## 2018-01-01 RX ADMIN — Medication 10 ML: at 06:32

## 2018-01-01 RX ADMIN — PANTOPRAZOLE SODIUM 40 MG: 40 TABLET, DELAYED RELEASE ORAL at 06:31

## 2018-01-01 RX ADMIN — PANTOPRAZOLE SODIUM 40 MG: 40 TABLET, DELAYED RELEASE ORAL at 17:12

## 2018-01-01 RX ADMIN — LEVOTHYROXINE SODIUM 75 MCG: 75 TABLET ORAL at 05:10

## 2018-01-01 RX ADMIN — INSULIN LISPRO 2 UNITS: 100 INJECTION, SOLUTION INTRAVENOUS; SUBCUTANEOUS at 17:27

## 2018-01-01 RX ADMIN — MORPHINE SULFATE 1 MG: 2 INJECTION, SOLUTION INTRAMUSCULAR; INTRAVENOUS at 00:42

## 2018-01-01 RX ADMIN — CEFEPIME HYDROCHLORIDE 1 G: 1 INJECTION, POWDER, FOR SOLUTION INTRAMUSCULAR; INTRAVENOUS at 11:40

## 2018-01-01 RX ADMIN — INSULIN LISPRO 2 UNITS: 100 INJECTION, SOLUTION INTRAVENOUS; SUBCUTANEOUS at 17:33

## 2018-01-01 RX ADMIN — LEVOTHYROXINE SODIUM 25 MCG: 25 TABLET ORAL at 07:01

## 2018-01-01 RX ADMIN — MORPHINE SULFATE 2 MG: 2 INJECTION, SOLUTION INTRAMUSCULAR; INTRAVENOUS at 16:49

## 2018-01-01 RX ADMIN — HYDROXYZINE HYDROCHLORIDE 10 MG: 10 TABLET, FILM COATED ORAL at 00:46

## 2018-01-01 RX ADMIN — HYDROGEN PEROXIDE: 3 LIQUID TOPICAL at 12:23

## 2018-01-01 RX ADMIN — MORPHINE SULFATE 1 MG: 2 INJECTION, SOLUTION INTRAMUSCULAR; INTRAVENOUS at 00:30

## 2018-01-01 RX ADMIN — MIDODRINE HYDROCHLORIDE 10 MG: 5 TABLET ORAL at 23:24

## 2018-01-01 RX ADMIN — Medication 5 MG: at 21:27

## 2018-01-01 RX ADMIN — SODIUM CHLORIDE 8 MG/HR: 9 INJECTION, SOLUTION INTRAVENOUS at 16:29

## 2018-01-01 RX ADMIN — CEFEPIME HYDROCHLORIDE 1 G: 1 INJECTION, POWDER, FOR SOLUTION INTRAMUSCULAR; INTRAVENOUS at 10:44

## 2018-01-01 RX ADMIN — POVIDONE-IODINE: 10 SOLUTION TOPICAL at 09:24

## 2018-01-01 RX ADMIN — PANTOPRAZOLE SODIUM 40 MG: 40 TABLET, DELAYED RELEASE ORAL at 17:33

## 2018-01-01 RX ADMIN — MORPHINE SULFATE 1 MG: 2 INJECTION, SOLUTION INTRAMUSCULAR; INTRAVENOUS at 05:12

## 2018-01-01 RX ADMIN — LORAZEPAM 0.5 MG: 2 INJECTION INTRAMUSCULAR; INTRAVENOUS at 19:38

## 2018-01-01 RX ADMIN — MORPHINE SULFATE 2 MG: 2 INJECTION, SOLUTION INTRAMUSCULAR; INTRAVENOUS at 04:33

## 2018-01-01 RX ADMIN — LORAZEPAM 0.5 MG: 2 INJECTION INTRAMUSCULAR; INTRAVENOUS at 23:29

## 2018-01-01 RX ADMIN — MIDODRINE HYDROCHLORIDE 10 MG: 5 TABLET ORAL at 12:11

## 2018-01-01 RX ADMIN — LORAZEPAM 0.5 MG: 2 INJECTION INTRAMUSCULAR; INTRAVENOUS at 11:06

## 2018-01-01 RX ADMIN — ONDANSETRON 4 MG: 2 INJECTION INTRAMUSCULAR; INTRAVENOUS at 02:26

## 2018-01-01 RX ADMIN — CEFTRIAXONE SODIUM 1 G: 1 INJECTION, SOLUTION INTRAVENOUS at 20:35

## 2018-01-01 RX ADMIN — ONDANSETRON 4 MG: 2 INJECTION INTRAMUSCULAR; INTRAVENOUS at 05:38

## 2018-01-01 RX ADMIN — MORPHINE SULFATE 1 MG: 2 INJECTION, SOLUTION INTRAMUSCULAR; INTRAVENOUS at 12:12

## 2018-01-01 RX ADMIN — ACETAMINOPHEN 650 MG: 325 TABLET, FILM COATED ORAL at 21:28

## 2018-01-01 RX ADMIN — MORPHINE SULFATE 1 MG: 2 INJECTION, SOLUTION INTRAMUSCULAR; INTRAVENOUS at 09:33

## 2018-01-01 RX ADMIN — PANTOPRAZOLE SODIUM 40 MG: 40 INJECTION, POWDER, FOR SOLUTION INTRAVENOUS at 21:13

## 2018-01-01 RX ADMIN — INSULIN LISPRO 2 UNITS: 100 INJECTION, SOLUTION INTRAVENOUS; SUBCUTANEOUS at 20:34

## 2018-01-01 RX ADMIN — INSULIN LISPRO 2 UNITS: 100 INJECTION, SOLUTION INTRAVENOUS; SUBCUTANEOUS at 18:35

## 2018-01-01 RX ADMIN — ONDANSETRON 4 MG: 2 INJECTION INTRAMUSCULAR; INTRAVENOUS at 22:23

## 2018-01-01 RX ADMIN — ACETAMINOPHEN 650 MG: 325 TABLET, FILM COATED ORAL at 17:32

## 2018-01-01 RX ADMIN — HEPARIN SODIUM 5000 UNITS: 5000 INJECTION, SOLUTION INTRAVENOUS; SUBCUTANEOUS at 06:03

## 2018-01-01 RX ADMIN — PANTOPRAZOLE SODIUM 40 MG: 40 TABLET, DELAYED RELEASE ORAL at 17:52

## 2018-01-01 RX ADMIN — MIDODRINE HYDROCHLORIDE 10 MG: 5 TABLET ORAL at 04:20

## 2018-01-01 RX ADMIN — CEFEPIME HYDROCHLORIDE 1 G: 1 INJECTION, POWDER, FOR SOLUTION INTRAMUSCULAR; INTRAVENOUS at 11:28

## 2018-01-01 RX ADMIN — POVIDONE-IODINE: 10 SOLUTION TOPICAL at 08:37

## 2018-01-01 RX ADMIN — PANTOPRAZOLE SODIUM 40 MG: 40 TABLET, DELAYED RELEASE ORAL at 18:09

## 2018-01-01 RX ADMIN — HYDROGEN PEROXIDE: 3 LIQUID TOPICAL at 05:03

## 2018-01-01 RX ADMIN — ONDANSETRON 4 MG: 2 INJECTION INTRAMUSCULAR; INTRAVENOUS at 08:14

## 2018-01-01 RX ADMIN — NEOSTIGMINE METHYLSULFATE 2 MG: 1 INJECTION, SOLUTION INTRAVENOUS at 14:36

## 2018-01-01 RX ADMIN — HYDROGEN PEROXIDE: 3 LIQUID TOPICAL at 02:10

## 2018-01-01 RX ADMIN — ALLOPURINOL 100 MG: 100 TABLET ORAL at 09:45

## 2018-01-01 RX ADMIN — Medication 10 ML: at 17:54

## 2018-01-01 RX ADMIN — LEVOTHYROXINE SODIUM 75 MCG: 75 TABLET ORAL at 05:50

## 2018-01-01 RX ADMIN — HYDROGEN PEROXIDE: 3 LIQUID TOPICAL at 00:10

## 2018-01-01 RX ADMIN — ATRACURIUM BESYLATE 30 MG: 10 INJECTION, SOLUTION INTRAVENOUS at 14:28

## 2018-01-01 RX ADMIN — MIDODRINE HYDROCHLORIDE 10 MG: 5 TABLET ORAL at 17:45

## 2018-01-01 RX ADMIN — MORPHINE SULFATE 1 MG: 2 INJECTION, SOLUTION INTRAMUSCULAR; INTRAVENOUS at 12:15

## 2018-01-01 RX ADMIN — PANTOPRAZOLE SODIUM 40 MG: 40 INJECTION, POWDER, FOR SOLUTION INTRAVENOUS at 08:55

## 2018-01-01 RX ADMIN — ALLOPURINOL 100 MG: 100 TABLET ORAL at 08:25

## 2018-01-01 RX ADMIN — POVIDONE-IODINE: 10 SOLUTION TOPICAL at 10:06

## 2018-01-01 RX ADMIN — INSULIN LISPRO 2 UNITS: 100 INJECTION, SOLUTION INTRAVENOUS; SUBCUTANEOUS at 22:24

## 2018-01-01 RX ADMIN — ROBINUL 0.4 MG: 0.2 INJECTION INTRAMUSCULAR; INTRAVENOUS at 23:31

## 2018-01-01 RX ADMIN — MORPHINE SULFATE 1 MG: 10 INJECTION INTRAVENOUS at 08:41

## 2018-01-01 RX ADMIN — ONDANSETRON 4 MG: 2 INJECTION INTRAMUSCULAR; INTRAVENOUS at 09:33

## 2018-01-01 RX ADMIN — ACETAMINOPHEN 650 MG: 325 TABLET, FILM COATED ORAL at 21:16

## 2018-01-01 RX ADMIN — MORPHINE SULFATE 1 MG: 2 INJECTION, SOLUTION INTRAMUSCULAR; INTRAVENOUS at 06:08

## 2018-01-01 RX ADMIN — LEVOTHYROXINE SODIUM 75 MCG: 75 TABLET ORAL at 05:49

## 2018-01-01 RX ADMIN — MIDODRINE HYDROCHLORIDE 10 MG: 5 TABLET ORAL at 02:05

## 2018-01-01 RX ADMIN — HEPARIN SODIUM 5000 UNITS: 5000 INJECTION, SOLUTION INTRAVENOUS; SUBCUTANEOUS at 23:17

## 2018-01-01 RX ADMIN — INSULIN LISPRO 2 UNITS: 100 INJECTION, SOLUTION INTRAVENOUS; SUBCUTANEOUS at 05:27

## 2018-01-01 RX ADMIN — MIDODRINE HYDROCHLORIDE 10 MG: 5 TABLET ORAL at 11:48

## 2018-01-01 RX ADMIN — ACETAMINOPHEN 650 MG: 325 TABLET, FILM COATED ORAL at 00:34

## 2018-01-01 RX ADMIN — LORAZEPAM 0.5 MG: 2 INJECTION INTRAMUSCULAR; INTRAVENOUS at 20:39

## 2018-01-01 RX ADMIN — MORPHINE SULFATE 2 MG: 2 INJECTION, SOLUTION INTRAMUSCULAR; INTRAVENOUS at 20:29

## 2018-01-01 RX ADMIN — ONDANSETRON 4 MG: 2 INJECTION INTRAMUSCULAR; INTRAVENOUS at 17:44

## 2018-01-01 RX ADMIN — Medication 5 MG: at 21:45

## 2018-01-01 RX ADMIN — MORPHINE SULFATE 1 MG: 2 INJECTION, SOLUTION INTRAMUSCULAR; INTRAVENOUS at 17:48

## 2018-01-01 RX ADMIN — MIDODRINE HYDROCHLORIDE 10 MG: 5 TABLET ORAL at 10:23

## 2018-01-01 RX ADMIN — PANTOPRAZOLE SODIUM 40 MG: 40 TABLET, DELAYED RELEASE ORAL at 05:02

## 2018-01-01 RX ADMIN — HYDROGEN PEROXIDE: 3 LIQUID TOPICAL at 17:42

## 2018-01-01 RX ADMIN — LEVOTHYROXINE SODIUM 25 MCG: 25 TABLET ORAL at 05:57

## 2018-01-01 RX ADMIN — ONDANSETRON 4 MG: 2 INJECTION INTRAMUSCULAR; INTRAVENOUS at 09:23

## 2018-01-01 RX ADMIN — HYDROGEN PEROXIDE: 3 LIQUID TOPICAL at 00:52

## 2018-01-01 RX ADMIN — INSULIN LISPRO 2 UNITS: 100 INJECTION, SOLUTION INTRAVENOUS; SUBCUTANEOUS at 20:12

## 2018-01-01 RX ADMIN — ACETAMINOPHEN 650 MG: 325 TABLET, FILM COATED ORAL at 22:24

## 2018-01-01 RX ADMIN — HYDROGEN PEROXIDE 5 ML: 3 LIQUID TOPICAL at 17:47

## 2018-01-01 RX ADMIN — ALLOPURINOL 100 MG: 100 TABLET ORAL at 09:38

## 2018-01-01 RX ADMIN — CEFEPIME HYDROCHLORIDE 1 G: 1 INJECTION, POWDER, FOR SOLUTION INTRAMUSCULAR; INTRAVENOUS at 10:23

## 2018-01-01 RX ADMIN — HYDROGEN PEROXIDE: 3 LIQUID TOPICAL at 06:08

## 2018-01-01 RX ADMIN — MORPHINE SULFATE 1 MG: 2 INJECTION, SOLUTION INTRAMUSCULAR; INTRAVENOUS at 06:32

## 2018-01-01 RX ADMIN — LORAZEPAM 0.25 MG: 2 INJECTION INTRAMUSCULAR; INTRAVENOUS at 17:18

## 2018-01-01 RX ADMIN — HYDROGEN PEROXIDE: 3 LIQUID TOPICAL at 17:54

## 2018-01-01 RX ADMIN — CEFEPIME HYDROCHLORIDE 1 G: 1 INJECTION, POWDER, FOR SOLUTION INTRAMUSCULAR; INTRAVENOUS at 11:48

## 2018-01-01 RX ADMIN — SODIUM CHLORIDE 30 ML/HR: 9 INJECTION, SOLUTION INTRAVENOUS at 01:05

## 2018-01-01 RX ADMIN — DEXTROSE MONOHYDRATE 25 G: 25 INJECTION, SOLUTION INTRAVENOUS at 21:58

## 2018-01-01 RX ADMIN — SODIUM CHLORIDE 1000 ML: 9 INJECTION, SOLUTION INTRAVENOUS at 20:00

## 2018-01-01 RX ADMIN — HYDROGEN PEROXIDE 5 ML: 3 LIQUID TOPICAL at 17:33

## 2018-01-01 RX ADMIN — SODIUM CHLORIDE 8 MG/HR: 9 INJECTION, SOLUTION INTRAVENOUS at 21:34

## 2018-01-01 RX ADMIN — ONDANSETRON 4 MG: 2 INJECTION INTRAMUSCULAR; INTRAVENOUS at 22:42

## 2018-01-01 RX ADMIN — HYDROGEN PEROXIDE: 3 LIQUID TOPICAL at 17:18

## 2018-01-01 RX ADMIN — LEVOTHYROXINE SODIUM 75 MCG: 75 TABLET ORAL at 06:31

## 2018-01-01 RX ADMIN — HYDROGEN PEROXIDE: 3 LIQUID TOPICAL at 18:06

## 2018-01-01 RX ADMIN — HYDROGEN PEROXIDE 5 ML: 3 LIQUID TOPICAL at 12:15

## 2018-01-01 RX ADMIN — ACETAMINOPHEN 650 MG: 325 TABLET, FILM COATED ORAL at 08:34

## 2018-01-01 RX ADMIN — ALLOPURINOL 100 MG: 100 TABLET ORAL at 08:31

## 2018-01-01 RX ADMIN — MORPHINE SULFATE 2 MG: 2 INJECTION, SOLUTION INTRAMUSCULAR; INTRAVENOUS at 15:02

## 2018-01-01 RX ADMIN — MIDODRINE HYDROCHLORIDE 10 MG: 5 TABLET ORAL at 10:35

## 2018-01-01 RX ADMIN — ALBUMIN HUMAN 50 G: 0.25 SOLUTION INTRAVENOUS at 04:16

## 2018-01-01 RX ADMIN — MIDODRINE HYDROCHLORIDE 10 MG: 5 TABLET ORAL at 02:10

## 2018-01-01 RX ADMIN — MORPHINE SULFATE 1 MG: 10 INJECTION INTRAVENOUS at 22:22

## 2018-01-01 RX ADMIN — MIDODRINE HYDROCHLORIDE 10 MG: 5 TABLET ORAL at 18:03

## 2018-01-01 RX ADMIN — PANTOPRAZOLE SODIUM 40 MG: 40 TABLET, DELAYED RELEASE ORAL at 17:45

## 2018-01-01 RX ADMIN — SODIUM CHLORIDE 8 MG/HR: 9 INJECTION, SOLUTION INTRAVENOUS at 21:32

## 2018-01-01 RX ADMIN — HYDROGEN PEROXIDE: 3 LIQUID TOPICAL at 18:02

## 2018-01-01 RX ADMIN — NOREPINEPHRINE BITARTRATE 0.02 MCG/KG/MIN: 8 SOLUTION at 09:06

## 2018-01-01 RX ADMIN — PANTOPRAZOLE SODIUM 40 MG: 40 TABLET, DELAYED RELEASE ORAL at 09:37

## 2018-01-01 RX ADMIN — HYDROGEN PEROXIDE: 3 LIQUID TOPICAL at 00:42

## 2018-01-01 RX ADMIN — PANTOPRAZOLE SODIUM 40 MG: 40 TABLET, DELAYED RELEASE ORAL at 08:31

## 2018-01-01 RX ADMIN — MORPHINE SULFATE 1 MG: 10 INJECTION INTRAVENOUS at 17:17

## 2018-01-01 RX ADMIN — AZITHROMYCIN MONOHYDRATE 500 MG: 500 INJECTION, POWDER, LYOPHILIZED, FOR SOLUTION INTRAVENOUS at 20:36

## 2018-01-01 RX ADMIN — LORAZEPAM 0.5 MG: 2 INJECTION INTRAMUSCULAR; INTRAVENOUS at 00:56

## 2018-01-01 RX ADMIN — MORPHINE SULFATE 2 MG: 2 INJECTION, SOLUTION INTRAMUSCULAR; INTRAVENOUS at 04:29

## 2018-01-01 RX ADMIN — ACETAMINOPHEN 650 MG: 325 TABLET, FILM COATED ORAL at 10:45

## 2018-01-01 RX ADMIN — PANTOPRAZOLE SODIUM 40 MG: 40 TABLET, DELAYED RELEASE ORAL at 05:10

## 2018-01-01 RX ADMIN — MORPHINE SULFATE 2 MG: 2 INJECTION, SOLUTION INTRAMUSCULAR; INTRAVENOUS at 20:39

## 2018-01-01 RX ADMIN — HEPARIN SODIUM 5000 UNITS: 5000 INJECTION, SOLUTION INTRAVENOUS; SUBCUTANEOUS at 21:16

## 2018-01-01 RX ADMIN — HYDROGEN PEROXIDE: 3 LIQUID TOPICAL at 06:01

## 2018-01-01 RX ADMIN — MORPHINE SULFATE 1 MG: 2 INJECTION, SOLUTION INTRAMUSCULAR; INTRAVENOUS at 00:25

## 2018-05-15 PROBLEM — D62 ACUTE BLOOD LOSS ANEMIA: Status: ACTIVE | Noted: 2018-01-01

## 2018-05-15 PROBLEM — I42.9 CARDIOMYOPATHY (HCC): Status: ACTIVE | Noted: 2018-01-01

## 2018-05-15 PROBLEM — N18.4 CKD (CHRONIC KIDNEY DISEASE), STAGE IV (HCC): Status: ACTIVE | Noted: 2018-01-01

## 2018-05-15 PROBLEM — I50.22 CHRONIC SYSTOLIC HEART FAILURE (HCC): Status: ACTIVE | Noted: 2018-01-01

## 2018-05-15 PROBLEM — E03.9 HYPOTHYROIDISM: Status: ACTIVE | Noted: 2018-01-01

## 2018-05-15 PROBLEM — I25.10 CAD (CORONARY ARTERY DISEASE): Status: ACTIVE | Noted: 2018-01-01

## 2018-05-15 PROBLEM — E11.9 DIABETES MELLITUS (HCC): Status: ACTIVE | Noted: 2018-01-01

## 2018-05-15 PROBLEM — L89.90 DECUBITUS ULCER: Status: ACTIVE | Noted: 2018-01-01

## 2018-05-15 PROBLEM — K92.2 GI BLEED: Status: ACTIVE | Noted: 2018-01-01

## 2018-05-15 PROBLEM — I48.0 PAF (PAROXYSMAL ATRIAL FIBRILLATION) (HCC): Status: ACTIVE | Noted: 2018-01-01

## 2018-05-17 PROBLEM — K92.2 ACUTE GI BLEEDING: Status: ACTIVE | Noted: 2018-01-01

## 2018-05-17 PROBLEM — Z87.19 HISTORY OF DUODENAL ULCER: Status: ACTIVE | Noted: 2018-01-01

## 2018-05-17 NOTE — ANESTHESIA POSTPROCEDURE EVALUATION
Patient: Lindsey Tsai    Procedure Summary     Date:  05/17/18 Room / Location:   [facility]    Anesthesia Start:  1428 Anesthesia Stop:  1502    Procedure:  ESOPHAGOGASTRODUODENOSCOPY (N/A Esophagus) Diagnosis:       Acute GI bleeding      History of duodenal ulcer      (Acute GI bleeding [K92.2])      (History of duodenal ulcer [Z87.19])    Surgeon:  Pancho Ackerman MD Provider:  Dirk Dale MD    Anesthesia Type:  general ASA Status:  4 - Emergent          Anesthesia Type: general  Last vitals  BP   106/72 (05/17/18 1400)   Temp   98.1 °F (36.7 °C) (05/17/18 1145)   Pulse   84 (05/17/18 1400)   Resp   18 (05/17/18 1400)     SpO2   96 % (05/17/18 1400)     Post Anesthesia Care and Evaluation    Patient location during evaluation: PACU  Patient participation: complete - patient cannot participate  Level of consciousness: awake  Pain score: 0  Pain management: adequate  Airway patency: patent  Anesthetic complications: No anesthetic complications  PONV Status: none  Cardiovascular status: hemodynamically stable and acceptable  Respiratory status: nonlabored ventilation, acceptable and nasal cannula  Hydration status: acceptable

## 2018-05-17 NOTE — ANESTHESIA PREPROCEDURE EVALUATION
Anesthesia Evaluation     Patient summary reviewed and Nursing notes reviewed   no history of anesthetic complications:  NPO Solid Status: Waived due to emergency  NPO Liquid Status: Waived due to emergency           Airway   Mallampati: II  TM distance: >3 FB  Neck ROM: full  No difficulty expected  Dental - normal exam     Pulmonary - negative pulmonary ROS and normal exam    breath sounds clear to auscultation  Cardiovascular - normal exam    ECG reviewed  Rhythm: regular  Rate: normal    (+) CAD, dysrhythmias (paroxysmal) Atrial Fib, CHF (EF 20-30%),       Neuro/Psych- negative ROS  GI/Hepatic/Renal/Endo    (+)  GI bleeding, renal disease CRI, diabetes mellitus, hypothyroidism,     Musculoskeletal (-) negative ROS    Abdominal    Substance History - negative use     OB/GYN          Other - negative ROS                       Anesthesia Plan    ASA 4 - emergent     general   Rapid sequence  intravenous induction   Anesthetic plan and risks discussed with patient.    Plan discussed with CRNA.

## 2018-06-01 NOTE — PROGRESS NOTES
"Palliative Care Progress Note    Date of Admission: 5/15/2018    Subjective:  Patient appears relaxed, opens eyes to name but falls quickly back to sleep.   No family at bedside.   Reviewed and updated with nursing.  Reports that patient has lots of moaning and stiffness with repositioning. Prn morphine is effective. Patient is having more difficulty with any po intake  Reviewed current scheduled and prn medications for route, type, dose, and frequency.     •  acetaminophen **OR** acetaminophen  •  bisacodyl  •  glycopyrrolate  •  hydrOXYzine  •  LORazepam  •  Morphine  •  ondansetron  •  sodium chloride  •  sodium chloride    Objective:  PPS 20%   BP (!) 83/63 (BP Location: Right arm, Patient Position: Lying) Comment: RN notified  Pulse 76   Temp 98.7 °F (37.1 °C) (Oral)   Resp 14   Ht 163 cm (64.17\")   Wt 73.6 kg (162 lb 3.2 oz)   SpO2 99%   BMI 27.69 kg/m²    Intake & Output (last day)       05/31 0701 - 06/01 0700 06/01 0701 - 06/02 0700    P.O.  240    Total Intake(mL/kg)  240 (3.3)    Urine (mL/kg/hr) 300 (0.2)     Stool 0 (0)     Total Output 300      Net -300 +240          Unmeasured Stool Occurrence 1 x         Lab Results (last 24 hours)     ** No results found for the last 24 hours. **        Imaging Results (last 24 hours)     ** No results found for the last 24 hours. **          Physical Exam:  Gen: NAD, resting in bed  Skin: warm, dry   Eyes: KATHRINE, conjunctiva clear and moist   Resp/thorax: even effort, non labored, CTA   CV: RRR   ABD: soft, bowel sounds +, non distended  Ext: no edema, no redness   Neuro: resting, minimally responsive, no myoclonus       Reviewed labs and diagnostic results.   Lab Results   Component Value Date    HGBA1C 5.3 05/16/2018           Results from last 7 days  Lab Units 05/29/18  1247   SODIUM mmol/L 133   POTASSIUM mmol/L 4.6   CHLORIDE mmol/L 93*   CO2 mmol/L 19.0*   BUN mg/dL 130*   CREATININE mg/dL 5.70*   CALCIUM mg/dL 8.4*   BILIRUBIN mg/dL 0.7   ALK PHOS " U/L 144*   ALT (SGPT) U/L 5*   AST (SGOT) U/L 13   GLUCOSE mg/dL 89       Impression: 72 y.o. female with CKD stage V declining HD, Afib, GIB, ICM EF 25%    Plan:   General myofascial pain - continue with prn morphine, discussed use of acetaminophen suppository. Nursing reports that the patient is reluctant to accept prn morphine.     Dysphagia - continue to monitor, provide comfort meds with IV route.     Plan - transitioning to comfort focused plan of care per patient's wishes.   Provide support to son with changes in clinical status and recognition that patient is nearing the end of her life.     Time: 30 minutes       Martina Caruso, ELIOT  [phone]  06/01/18  3:50 PM

## 2018-06-01 NOTE — PLAN OF CARE
Problem: Patient Care Overview  Goal: Interprofessional Rounds/Family Conf  Outcome: Ongoing (interventions implemented as appropriate)  Palliative Team Attendance 1300. Aaron BOOKER,  Shabana Do RN CHPN, Rosanne Gusman MDIV, Leyla Lewis RN   06/01/18 1300   Interdisciplinary Rounds/Family Conf   Summary comfort measures. continue comfort plan of care. manage symptoms. minimal intake

## 2018-06-01 NOTE — PLAN OF CARE
Problem: Patient Care Overview  Goal: Plan of Care Review  Outcome: Ongoing (interventions implemented as appropriate)   06/01/18 1619   Coping/Psychosocial   Plan of Care Reviewed With patient   Plan of Care Review   Progress declining   OTHER   Outcome Summary Patient lethargic today and refuses to eat. No s/s of pain noted this afternoon. No family present at bedside.     Goal: Individualization and Mutuality  Outcome: Ongoing (interventions implemented as appropriate)    Goal: Discharge Needs Assessment  Outcome: Ongoing (interventions implemented as appropriate)   05/16/18 1348 06/01/18 1618   Discharge Needs Assessment   Concerns to be Addressed discharge planning --    Patient/Family Anticipates Transition to --  inpatient hospice   Patient/Family Anticipated Services at Transition --  hospice care   Outpatient/Agency/Support Group Needs --  hospice facility

## 2018-06-01 NOTE — NURSING NOTE
Late note for 5/31/18 1000.  Hospice RN, Pema Harry visited with son and discussed admitting patient to inpatient hospice.  Son stated that he would be back in the afternoon and did not want to sign paperwork at time of 1000 visit.      Hospice team attempted to meet up with son later in the day, 5/31/18 but he did not return calls or come back to hospital.

## 2018-06-01 NOTE — PROGRESS NOTES
[facility] Medicine Services  PROGRESS NOTE    Patient Name: Lindsey Tsai  : 1945  MRN: [id number]    Date of Admission: 5/15/2018  Length of Stay: 16  Primary Care Physician: Vasquez Whalen MD    Subjective   Subjective     CC:  Weakness and abnormal labs     HPI:    No family present.  She is less responsive today.  Looks comfortable.    Review of Systems  Unable to obtain from patient.    Objective   Objective     Vital Signs:   Temp:  [98.7 °F (37.1 °C)] 98.7 °F (37.1 °C)  Heart Rate:  [76-80] 76  Resp:  [14-16] 14  BP: (83-93)/(63-65) 83/63        Physical Exam:  Constitutional: ill appearing, breifly arouses  HENT: NCAT, dry mouth  Respiratory: Clear to auscultation bilaterally, nonlabored respirations, 2L NC   Cardiovascular: RRR, s1 and s2  Gastrointestinal: Positive bowel sounds, soft, nontender, nondistended  Musculoskeletal: No bilateral ankle edema  Psychiatric:unable to asses  Neurologic: unable to assess  Skin: No rashes, pale     Results Reviewed:  I have personally reviewed current lab, radiology, and data and agree.    Estimated Creatinine Clearance: 8.9 mL/min (A) (by C-G formula based on SCr of 5.7 mg/dL (H)).  No results found for: BNP  No results found for: PHART    Microbiology Results Abnormal     Procedure Component Value - Date/Time    Urine Culture - Urine, Urine, Clean Catch [[id number]]  (Abnormal)  (Susceptibility) Collected:  18 0145    Lab Status:  Final result Specimen:  Urine from Urine, Clean Catch Updated:  18 1332     Urine Culture >100,000 CFU/mL Pseudomonas aeruginosa (A)     Comment: Morphological Type 1           >100,000 CFU/mL Pseudomonas aeruginosa (A)     Comment: Morphological Type 2         Susceptibility      Pseudomonas aeruginosa (1)    Pseudomonas aeruginosa (2)       BENEDICT    BENEDICT       Amikacin       <=16 ug/ml Susceptible       Aztreonam 16 ug/ml Intermediate    <=8 ug/ml Susceptible       Cefepime <=8 ug/ml  Susceptible    <=8 ug/ml Susceptible       Ceftazidime 4 ug/ml Susceptible    4 ug/ml Susceptible       Gentamicin <=4 ug/ml Susceptible    >8 ug/ml Resistant       Levofloxacin <=2 ug/ml Susceptible    >4 ug/ml Resistant       Meropenem <=1 ug/ml Susceptible    4 ug/ml Susceptible       Piperacillin + Tazobactam <=16 ug/ml Susceptible    <=16 ug/ml Susceptible       Tobramycin <=4 ug/ml Susceptible    >8 ug/ml Resistant                      Eosinophil Smear - Urine, Urine, Clean Catch [[id number]]  (Normal) Collected:  05/16/18 1105    Lab Status:  Final result Specimen:  Urine from Urine, Clean Catch Updated:  05/16/18 1215     Eosinophil Smear 0 % EOS/100 Cells     Narrative:       No eosinophil seen          Imaging Results (last 24 hours)     ** No results found for the last 24 hours. **        Results for orders placed during the hospital encounter of 05/15/18   Adult Transthoracic Echo Complete W/ Cont if Necessary Per Protocol    Narrative · Right ventricular cavity is borderline dilated.  · Moderately reduced right ventricular systolic function noted.  · Left atrial cavity size is mildly dilated.  · Moderate-to-severe mitral valve regurgitation is present  · Moderate tricuspid valve regurgitation is present.  · Mild pulmonic valve regurgitation is present.  · Left ventricular systolic function is severely decreased. Estimated EF =   20%.        I have reviewed the medications.    Assessment/Plan   Assessment / Plan     Hospital Problem List     * (Principal)GI bleed    CAD (coronary artery disease)    Chronic systolic heart failure    Overview Signed 5/15/2018 11:16 PM by Zack Mitchell MD     EF 20-30% 3/2018         Type 2 diabetes mellitus    CKD (chronic kidney disease), stage IV    Acute blood loss anemia    Hypothyroidism    Decubitus ulcer, bilateral heels and coccyx    PAF (paroxysmal atrial fibrillation)    Acute GI bleeding    Overview Signed 5/17/2018  9:02 AM by TESFAYE Archibald      "Added automatically from request for surgery [id number]         History of duodenal ulcer    Overview Signed 5/17/2018  9:02 AM by TESFAYE Archibald     Added automatically from request for surgery [id number]              Brief Hospital Course to date:  Lindsey Tsai is a 72 y.o. female PMH of CAD, ICM EF 25%, PAF, DM, CKD V, recent GIB, decubiti, Hypothyroidism, [facility] - [facility] admitted 5/15/2018 w/ GIB and Hgb 5.  EGD normal.  Bleeding seems to have resolved.  Despite having ESRD patient refuses dialysis or further renal work-up.    Assessment & Plan:  UGIB - PPI, transfuse as needed, GI following, EGD normal.  GI has signed off, re-consult if necessary.     Hypotension - co-syntropin stim test neg, improved.  Off levophed.  Continue midodrine. Suspect she \"runs low\" because of underlying severe cardiomyopathy.     UTI - ferreira in place secondary to decubitus wounds, on Cefepime for 7 days for PSA in urine     ESRD, no dialysis per patient, nephrology signed off      CHF / Afib - severe CM/SHF on ECHO, with little that can be optimized in setting of ESRD in patient refusing dialysis    Awaiting transition to inpatient hospice once son signs paperwork.  She appears comfortable.    DVT Prophylaxis: none d/t comfort goals.    CODE STATUS: Comfort Measures and Allow Natural Death (A-N-D)    Disposition: I expect the patient to be discharged to inpatient hospice soon    Electronically signed by Roland Marie MD, 06/01/18, 3:45 PM.    "

## 2018-06-01 NOTE — PLAN OF CARE
Problem: Patient Care Overview  Goal: Plan of Care Review  Outcome: Ongoing (interventions implemented as appropriate)   06/01/18 9465   Coping/Psychosocial   Plan of Care Reviewed With patient   Plan of Care Review   Progress no change

## 2018-06-02 NOTE — PLAN OF CARE
Problem: Dying Patient, Actively (Adult)  Goal: Identify Related Risk Factors and Signs and Symptoms  Outcome: Ongoing (interventions implemented as appropriate)   06/02/18 1430   Dying Patient, Actively (Adult)   Related Risk Factors (Actively Dying Patient) impaired swallowing;rate of onset of illness;level of consciousness   Signs and Symptoms (Actively Dying Patient) changes in bladder elimination pattern;changes in bowel elimination pattern;total dependency;drowsiness for extended period;food/fluid disinterest/withdrawal;skin color changes;swallowing difficulty;vital sign changes;profound weakness     Goal: Comfort/Pain Control  Outcome: Ongoing (interventions implemented as appropriate)    Goal: Peace/Preservation of Dignity During the Dying Process  Outcome: Ongoing (interventions implemented as appropriate)

## 2018-06-02 NOTE — PROGRESS NOTES
[facility] Medicine Services  PROGRESS NOTE    Patient Name: Lindsey Tsai  : 1945  MRN: [id number]    Date of Admission: 5/15/2018  Length of Stay: 17  Primary Care Physician: Vasquez Whalen MD    Subjective   Subjective     CC: f/u weakness    HPI: Resting comfortably in bed. No family at bedside. No concerns from nursing staff.    Review of Systems  UTO ROS due to AMS.    Otherwise ROS is negative except as mentioned in the HPI.    Objective   Objective     Vital Signs:   Temp:  [93.3 °F (34.1 °C)] 93.3 °F (34.1 °C)  Heart Rate:  [72] 72  Resp:  [14] 14  BP: (89-94)/(61-72) 94/61        Physical Exam:  Constitutional: Resting comfortably, chronically ill appearing  HENT: NCAT, mucous membranes moist  Respiratory: Clear to auscultation bilaterally, respiratory effort normal   Cardiovascular: RRR, no murmurs, rubs, or gallops, palpable pedal pulses bilaterally  Gastrointestinal: Positive bowel sounds, soft, nontender, nondistended  Musculoskeletal: No bilateral ankle edema  Psychiatric: ROHINI  Neurologic: ROHINI other than spontaneously moving extremities  Skin: No rashes    Results Reviewed:  I have personally reviewed current lab, radiology, and data and agree.      Results from last 7 days  Lab Units 18  1247   INR  1.36*       Results from last 7 days  Lab Units 18  1247   SODIUM mmol/L 133   POTASSIUM mmol/L 4.6   CHLORIDE mmol/L 93*   CO2 mmol/L 19.0*   BUN mg/dL 130*   CREATININE mg/dL 5.70*   GLUCOSE mg/dL 89   CALCIUM mg/dL 8.4*   ALT (SGPT) U/L 5*   AST (SGOT) U/L 13   TROPONIN I ng/mL 0.028     Estimated Creatinine Clearance: 8.9 mL/min (A) (by C-G formula based on SCr of 5.7 mg/dL (H)).  No results found for: BNP  No results found for: PHART    Microbiology Results Abnormal     Procedure Component Value - Date/Time    Urine Culture - Urine, Urine, Clean Catch [[id number]]  (Abnormal)  (Susceptibility) Collected:  18 0145    Lab Status:  Final  result Specimen:  Urine from Urine, Clean Catch Updated:  05/19/18 1332     Urine Culture >100,000 CFU/mL Pseudomonas aeruginosa (A)     Comment: Morphological Type 1           >100,000 CFU/mL Pseudomonas aeruginosa (A)     Comment: Morphological Type 2         Susceptibility      Pseudomonas aeruginosa (1)    Pseudomonas aeruginosa (2)       BENEDICT    BENEDICT       Amikacin       <=16 ug/ml Susceptible       Aztreonam 16 ug/ml Intermediate    <=8 ug/ml Susceptible       Cefepime <=8 ug/ml Susceptible    <=8 ug/ml Susceptible       Ceftazidime 4 ug/ml Susceptible    4 ug/ml Susceptible       Gentamicin <=4 ug/ml Susceptible    >8 ug/ml Resistant       Levofloxacin <=2 ug/ml Susceptible    >4 ug/ml Resistant       Meropenem <=1 ug/ml Susceptible    4 ug/ml Susceptible       Piperacillin + Tazobactam <=16 ug/ml Susceptible    <=16 ug/ml Susceptible       Tobramycin <=4 ug/ml Susceptible    >8 ug/ml Resistant                      Eosinophil Smear - Urine, Urine, Clean Catch [[id number]]  (Normal) Collected:  05/16/18 1105    Lab Status:  Final result Specimen:  Urine from Urine, Clean Catch Updated:  05/16/18 1215     Eosinophil Smear 0 % EOS/100 Cells     Narrative:       No eosinophil seen          Imaging Results (last 24 hours)     ** No results found for the last 24 hours. **        Results for orders placed during the hospital encounter of 05/15/18   Adult Transthoracic Echo Complete W/ Cont if Necessary Per Protocol    Narrative · Right ventricular cavity is borderline dilated.  · Moderately reduced right ventricular systolic function noted.  · Left atrial cavity size is mildly dilated.  · Moderate-to-severe mitral valve regurgitation is present  · Moderate tricuspid valve regurgitation is present.  · Mild pulmonic valve regurgitation is present.  · Left ventricular systolic function is severely decreased. Estimated EF =   20%.          I have reviewed the medications.    Assessment/Plan   Assessment / Plan     Hospital  "Problem List     * (Principal)GI bleed    CAD (coronary artery disease)    Chronic systolic heart failure    Overview Signed 5/15/2018 11:16 PM by Zack Mitchell MD     EF 20-30% 3/2018         Type 2 diabetes mellitus    CKD (chronic kidney disease), stage IV    Acute blood loss anemia    Hypothyroidism    Decubitus ulcer, bilateral heels and coccyx    PAF (paroxysmal atrial fibrillation)    Acute GI bleeding    Overview Signed 5/17/2018  9:02 AM by TESFAYE Archibald     Added automatically from request for surgery [id number]         History of duodenal ulcer    Overview Signed 5/17/2018  9:02 AM by TESFAYE Archibald     Added automatically from request for surgery [id number]               Brief Hospital Course to date:  Lindsey Tsai is a 72 y.o. female PMH of CAD, ICM EF 25%, PAF, DM, CKD V, recent GIB, decubiti, Hypothyroidism, [facility] - [facility] admitted 5/15/2018 w/ GIB and Hgb 5.  EGD normal.  Bleeding seems to have resolved.  Despite having ESRD patient refuses dialysis or further renal work-up.     Assessment & Plan:  UGIB - PPI, transfuse as needed, GI following, EGD normal.  GI has signed off, re-consult if necessary.     Hypotension - co-syntropin stim test neg, improved.  Off levophed.  Continue midodrine. Suspect she \"runs low\" because of underlying severe cardiomyopathy.     UTI - ferreira in place secondary to decubitus wounds, on Cefepime for 7 days for PSA in urine     ESRD, no dialysis per patient, nephrology signed off      CHF / Afib - severe CM/SHF on ECHO, with little that can be optimized in setting of ESRD in patient refusing dialysis     Awaiting transition to inpatient hospice once son signs paperwork.      Patient comfortable at this tati.     DVT Prophylaxis: none d/t comfort goals.     CODE STATUS: Comfort Measures and Allow Natural Death (A-N-D)     Disposition: I expect the patient to be discharged to inpatient hospice soon      Electronically signed by Lacy Denton, II, DO, " 06/02/18, 10:30 AM.

## 2018-06-03 NOTE — PLAN OF CARE
Problem: Patient Care Overview  Goal: Plan of Care Review  Outcome: Ongoing (interventions implemented as appropriate)   06/03/18 1400   Coping/Psychosocial   Plan of Care Reviewed With son   Plan of Care Review   Progress declining   OTHER   Outcome Summary reviewed comfort plan of care with son Antoine and discussed hospice. pt lethargic/drowsy. minimal intake.

## 2018-06-03 NOTE — PLAN OF CARE
Problem: Dying Patient, Actively (Adult)  Goal: Identify Related Risk Factors and Signs and Symptoms  Outcome: Ongoing (interventions implemented as appropriate)   06/03/18 1400   Dying Patient, Actively (Adult)   Related Risk Factors (Actively Dying Patient) impaired swallowing;level of consciousness   Signs and Symptoms (Actively Dying Patient) changes in bladder elimination pattern;changes in bowel elimination pattern;total dependency;food/fluid disinterest/withdrawal;drowsiness for extended period;swallowing difficulty;vital sign changes;profound weakness     Goal: Comfort/Pain Control  Outcome: Ongoing (interventions implemented as appropriate)    Goal: Peace/Preservation of Dignity During the Dying Process  Outcome: Ongoing (interventions implemented as appropriate)

## 2018-06-03 NOTE — PLAN OF CARE
Problem: Patient Care Overview  Goal: Plan of Care Review  Outcome: Ongoing (interventions implemented as appropriate)    Goal: Individualization and Mutuality  Outcome: Ongoing (interventions implemented as appropriate)    Goal: Discharge Needs Assessment  Outcome: Ongoing (interventions implemented as appropriate)      Problem: Wound (Includes Pressure Injury) (Adult)  Goal: Signs and Symptoms of Listed Potential Problems Will be Absent, Minimized or Managed (Wound)  Outcome: Ongoing (interventions implemented as appropriate)      Problem: Palliative Care (Adult)  Goal: Maximized Comfort  Outcome: Ongoing (interventions implemented as appropriate)    Goal: Enhanced Quality of Life  Outcome: Ongoing (interventions implemented as appropriate)    Goal: Identify Related Risk Factors and Signs and Symptoms  Outcome: Ongoing (interventions implemented as appropriate)    Goal: Maximized Comfort  Outcome: Ongoing (interventions implemented as appropriate)    Goal: Enhanced Quality of Life  Outcome: Ongoing (interventions implemented as appropriate)      Problem: Dying Patient, Actively (Adult)  Goal: Identify Related Risk Factors and Signs and Symptoms  Outcome: Ongoing (interventions implemented as appropriate)    Goal: Comfort/Pain Control  Outcome: Ongoing (interventions implemented as appropriate)    Goal: Peace/Preservation of Dignity During the Dying Process  Outcome: Ongoing (interventions implemented as appropriate)

## 2018-06-03 NOTE — PROGRESS NOTES
[facility] Medicine Services  PROGRESS NOTE    Patient Name: Lindsey Tsai  : 1945  MRN: [id number]    Date of Admission: 5/15/2018  Length of Stay: 18  Primary Care Physician: Vasquez Whalen MD    Subjective   Subjective     CC: f/u GIB    HPI: Patient in bed. Actively declining. Son very anxious at bedside.     Review of Systems  UTO due to mental status.    Otherwise ROS is negative except as mentioned in the HPI.    Objective   Objective     Vital Signs:   Temp:  [95 °F (35 °C)-97.3 °F (36.3 °C)] 97.3 °F (36.3 °C)  Heart Rate:  [92] 92  Resp:  [12-16] 16  BP: (79-88)/(61-62) 79/61        Physical Exam:  Constitutional: Distressed, unresponsive  HENT: NCAT, mucous membranes moist  Respiratory: Upper airway rhonchi  Cardiovascular: RRR, no murmurs, rubs, or gallops, palpable pedal pulses bilaterally  Gastrointestinal: Positive bowel sounds, soft, nontender, nondistended  Musculoskeletal: No bilateral ankle edema  Psychiatric: ROHINI  Neurologic: ROHINI  Skin: No rashes    Results Reviewed:  I have personally reviewed current lab, radiology, and data and agree.      Results from last 7 days  Lab Units 18  1247   INR  1.36*       Results from last 7 days  Lab Units 18  1247   SODIUM mmol/L 133   POTASSIUM mmol/L 4.6   CHLORIDE mmol/L 93*   CO2 mmol/L 19.0*   BUN mg/dL 130*   CREATININE mg/dL 5.70*   GLUCOSE mg/dL 89   CALCIUM mg/dL 8.4*   ALT (SGPT) U/L 5*   AST (SGOT) U/L 13   TROPONIN I ng/mL 0.028     Estimated Creatinine Clearance: 8.9 mL/min (A) (by C-G formula based on SCr of 5.7 mg/dL (H)).  No results found for: BNP  No results found for: PHART    Microbiology Results Abnormal     Procedure Component Value - Date/Time    Urine Culture - Urine, Urine, Clean Catch [[id number]]  (Abnormal)  (Susceptibility) Collected:  18 0142    Lab Status:  Final result Specimen:  Urine from Urine, Clean Catch Updated:  18 3292     Urine Culture >100,000 CFU/mL  Pseudomonas aeruginosa (A)     Comment: Morphological Type 1           >100,000 CFU/mL Pseudomonas aeruginosa (A)     Comment: Morphological Type 2         Susceptibility      Pseudomonas aeruginosa (1)    Pseudomonas aeruginosa (2)       BENEDICT    BENEDICT       Amikacin       <=16 ug/ml Susceptible       Aztreonam 16 ug/ml Intermediate    <=8 ug/ml Susceptible       Cefepime <=8 ug/ml Susceptible    <=8 ug/ml Susceptible       Ceftazidime 4 ug/ml Susceptible    4 ug/ml Susceptible       Gentamicin <=4 ug/ml Susceptible    >8 ug/ml Resistant       Levofloxacin <=2 ug/ml Susceptible    >4 ug/ml Resistant       Meropenem <=1 ug/ml Susceptible    4 ug/ml Susceptible       Piperacillin + Tazobactam <=16 ug/ml Susceptible    <=16 ug/ml Susceptible       Tobramycin <=4 ug/ml Susceptible    >8 ug/ml Resistant                      Eosinophil Smear - Urine, Urine, Clean Catch [[id number]]  (Normal) Collected:  05/16/18 1105    Lab Status:  Final result Specimen:  Urine from Urine, Clean Catch Updated:  05/16/18 1215     Eosinophil Smear 0 % EOS/100 Cells     Narrative:       No eosinophil seen          Imaging Results (last 24 hours)     ** No results found for the last 24 hours. **        Results for orders placed during the hospital encounter of 05/15/18   Adult Transthoracic Echo Complete W/ Cont if Necessary Per Protocol    Narrative · Right ventricular cavity is borderline dilated.  · Moderately reduced right ventricular systolic function noted.  · Left atrial cavity size is mildly dilated.  · Moderate-to-severe mitral valve regurgitation is present  · Moderate tricuspid valve regurgitation is present.  · Mild pulmonic valve regurgitation is present.  · Left ventricular systolic function is severely decreased. Estimated EF =   20%.          I have reviewed the medications.    Assessment/Plan   Assessment / Plan     Hospital Problem List     * (Principal)GI bleed    CAD (coronary artery disease)    Chronic systolic heart failure  "   Overview Signed 5/15/2018 11:16 PM by Zack Mitchell MD     EF 20-30% 3/2018         Type 2 diabetes mellitus    CKD (chronic kidney disease), stage IV    Acute blood loss anemia    Hypothyroidism    Decubitus ulcer, bilateral heels and coccyx    PAF (paroxysmal atrial fibrillation)    Acute GI bleeding    Overview Signed 5/17/2018  9:02 AM by TESFAYE Archibald     Added automatically from request for surgery [id number]         History of duodenal ulcer    Overview Signed 5/17/2018  9:02 AM by TESFAYE Archibald     Added automatically from request for surgery [id number]                  Brief Hospital Course to date:  Lindsey Tsai is a 72 y.o. female PMH of CAD, ICM EF 25%, PAF, DM, CKD V, recent GIB, decubiti, Hypothyroidism, [facility] - [facility] admitted 5/15/2018 w/ GIB and Hgb 5.  EGD normal.  Bleeding seems to have resolved.  Despite having ESRD patient refuses dialysis or further renal work-up.     Assessment & Plan:  UGIB - PPI, transfuse as needed, GI following, EGD normal.  GI has signed off, re-consult if necessary.     Hypotension - co-syntropin stim test neg, improved.  Off levophed.  Continue midodrine. Suspect she \"runs low\" because of underlying severe cardiomyopathy.     UTI - ferreira in place secondary to decubitus wounds, on Cefepime for 7 days for PSA in urine     ESRD, no dialysis per patient, nephrology signed off      CHF / Afib - severe CM/SHF on ECHO, with little that can be optimized in setting of ESRD in patient refusing dialysis     Awaiting transition to inpatient hospice once son signs paperwork.       Patient comfortable at this time. No concerns from her son.     DVT Prophylaxis: none d/t comfort goals.     CODE STATUS: Comfort Measures and Allow Natural Death (A-N-D)     Disposition: I expect the patient to be discharged to inpatient hospice soon    Counseling was given to family for the following topics: diagnostic results, prognosis and patient and family education . Total time " of the encounter was 50 minutes and 45 minutes was spend counseling.    Electronically signed by Lacy Denton II, DO, 06/03/18, 11:17 AM.

## 2018-06-04 NOTE — PROGRESS NOTES
Continued Stay Note  [facility]     Patient Name: Lindsey Tsai  MRN: [id number]  Today's Date: 6/4/2018    Admit Date: 5/15/2018          Discharge Plan     Row Name 06/04/18 1650       Plan    Plan Actively dying    Plan Comments Chart reviewed.  Persistent declining status.  Appears comfortable.  No family at bedside.  Following for hospice support.  If can be of further assist please contact.....ext [phone].              Discharge Codes    No documentation.       Expected Discharge Date and Time     Expected Discharge Date Expected Discharge Time    Jun 8, 2018             Savanah Boucher RN

## 2018-06-04 NOTE — NURSING NOTE
"When arriving on shift, patient's son (Alexander Tsai) was acting strange. He was talking to himself, twitching, staying in the bathroom for long periods of time, and raising voice to patient as well as staff. When Staff went into the room he would come very close and made staff feel uncomfortable. He was knocking things down in the room, swearing, and getting disruptive to the point of not being safe for patient or staff. He was acting like he had taken something to make him act strange. House Supervisor, Floridalma, was called. She came to look at the son and stood outside the door for a while just observing the patients actions. He was talking to himself and cont to act strange. Security was then called. when security arrived, Pt said he was a \"drug head\" who is reforming and goes to a clinic. He states he is feeling \"F**-ed Up\" because his mother is sick and his brother was killed. He then began to talk out of his head, not making sense. He also began to talk about being mad at the world, how he knew how to use guns, and karate. He then stated he wouldn't use them on the staff. Security then proceeded to ask him to leave for the sake of the safety of himself, his mother, and the staff. He was offered the opportunity to go to the ER to be checked out, he refused. He was also given the opportunity to call his family. He tried, but they refused to come get him or come to be with the patient. He was told that he was allowed back as long as his behavior was appropriate. He said he understood but wasn't sure if he could \"clean up\". Security and House Supervisor walked him out. Will cont to monitor situation, situation information was passed onto dayshift RN to be able to monitor as well. LIZZIE Singer    "

## 2018-06-04 NOTE — PROGRESS NOTES
[facility] Medicine Services  PROGRESS NOTE    Patient Name: Lindsey Tsai  : 1945  MRN: [id number]    Date of Admission: 5/15/2018  Length of Stay: 19  Primary Care Physician: Vasquez Whalen MD    Subjective   Subjective     CC: f/u decline    HPI: In bed without family at bedside. Patient appears comfortable at this time. No concerns from nursing.    Review of Systems  UTO due to AMS.    Otherwise ROS is negative except as mentioned in the HPI.    Objective   Objective     Vital Signs:   Temp:  [93.7 °F (34.3 °C)-97.1 °F (36.2 °C)] 97.1 °F (36.2 °C)  Heart Rate:  [71-75] 75  Resp:  [16] 16  BP: (89-95)/(61-64) 89/61        Physical Exam:  Constitutional: Comfortable, asleep  HENT: NCAT, mucous membranes moist  Respiratory: Clear to auscultation bilaterally, respiratory effort normal   Cardiovascular: RRR, no murmurs, rubs, or gallops, palpable pedal pulses bilaterally  Gastrointestinal: Positive bowel sounds, soft, nontender, nondistended  Musculoskeletal: No bilateral ankle edema  Psychiatric: ROHINI  Neurologic: ROHINI  Skin: No rashes    Results Reviewed:  I have personally reviewed current lab, radiology, and data and agree.      Results from last 7 days  Lab Units 18  1247   INR  1.36*       Results from last 7 days  Lab Units 18  1247   SODIUM mmol/L 133   POTASSIUM mmol/L 4.6   CHLORIDE mmol/L 93*   CO2 mmol/L 19.0*   BUN mg/dL 130*   CREATININE mg/dL 5.70*   GLUCOSE mg/dL 89   CALCIUM mg/dL 8.4*   ALT (SGPT) U/L 5*   AST (SGOT) U/L 13   TROPONIN I ng/mL 0.028     Estimated Creatinine Clearance: 8.9 mL/min (A) (by C-G formula based on SCr of 5.7 mg/dL (H)).  No results found for: BNP  No results found for: PHART    Microbiology Results Abnormal     Procedure Component Value - Date/Time    Urine Culture - Urine, Urine, Clean Catch [[id number]]  (Abnormal)  (Susceptibility) Collected:  18 0145    Lab Status:  Final result Specimen:  Urine from Urine,  Clean Catch Updated:  05/19/18 1332     Urine Culture >100,000 CFU/mL Pseudomonas aeruginosa (A)     Comment: Morphological Type 1           >100,000 CFU/mL Pseudomonas aeruginosa (A)     Comment: Morphological Type 2         Susceptibility      Pseudomonas aeruginosa (1)    Pseudomonas aeruginosa (2)       BENEDICT    BENEDICT       Amikacin       <=16 ug/ml Susceptible       Aztreonam 16 ug/ml Intermediate    <=8 ug/ml Susceptible       Cefepime <=8 ug/ml Susceptible    <=8 ug/ml Susceptible       Ceftazidime 4 ug/ml Susceptible    4 ug/ml Susceptible       Gentamicin <=4 ug/ml Susceptible    >8 ug/ml Resistant       Levofloxacin <=2 ug/ml Susceptible    >4 ug/ml Resistant       Meropenem <=1 ug/ml Susceptible    4 ug/ml Susceptible       Piperacillin + Tazobactam <=16 ug/ml Susceptible    <=16 ug/ml Susceptible       Tobramycin <=4 ug/ml Susceptible    >8 ug/ml Resistant                      Eosinophil Smear - Urine, Urine, Clean Catch [[id number]]  (Normal) Collected:  05/16/18 1105    Lab Status:  Final result Specimen:  Urine from Urine, Clean Catch Updated:  05/16/18 1215     Eosinophil Smear 0 % EOS/100 Cells     Narrative:       No eosinophil seen          Imaging Results (last 24 hours)     ** No results found for the last 24 hours. **        Results for orders placed during the hospital encounter of 05/15/18   Adult Transthoracic Echo Complete W/ Cont if Necessary Per Protocol    Narrative · Right ventricular cavity is borderline dilated.  · Moderately reduced right ventricular systolic function noted.  · Left atrial cavity size is mildly dilated.  · Moderate-to-severe mitral valve regurgitation is present  · Moderate tricuspid valve regurgitation is present.  · Mild pulmonic valve regurgitation is present.  · Left ventricular systolic function is severely decreased. Estimated EF =   20%.          I have reviewed the medications.    Assessment/Plan   Assessment / Plan     Hospital Problem List     * (Principal)GI  "bleed    CAD (coronary artery disease)    Chronic systolic heart failure    Overview Signed 5/15/2018 11:16 PM by Zack Mitchell MD     EF 20-30% 3/2018         Type 2 diabetes mellitus    CKD (chronic kidney disease), stage IV    Acute blood loss anemia    Hypothyroidism    Decubitus ulcer, bilateral heels and coccyx    PAF (paroxysmal atrial fibrillation)    Acute GI bleeding    Overview Signed 5/17/2018  9:02 AM by TESFAYE Archibald     Added automatically from request for surgery [id number]         History of duodenal ulcer    Overview Signed 5/17/2018  9:02 AM by TESFAYE Archibald     Added automatically from request for surgery [id number]               Brief Hospital Course to date:  Lindsey Tsai is a 72 y.o. female PMH of CAD, ICM EF 25%, PAF, DM, CKD V, recent GIB, decubiti, Hypothyroidism, [facility] - [facility] admitted 5/15/2018 w/ GIB and Hgb 5.  EGD normal.  Bleeding seems to have resolved.  Despite having ESRD patient refuses dialysis or further renal work-up. She continues to actively decline and is awaiting transition to hospice care.     Assessment & Plan:     Awaiting transition to inpatient hospice once son signs paperwork.       Patient comfortable at this time. No concerns from nursing. Unfortunately the son was escorted off the property last night due to active drug use and threatening behavior. This may complicate her transition to hospice.    UGIB - PPI, transfuse as needed, GI following, EGD normal.  GI has signed off, re-consult if necessary.     Hypotension - co-syntropin stim test neg, improved.  Off levophed.  Continue midodrine. Suspect she \"runs low\" because of underlying severe cardiomyopathy.     UTI - ferreira in place secondary to decubitus wounds, on Cefepime for 7 days for PSA in urine     ESRD, no dialysis per patient, nephrology signed off      CHF / Afib - severe CM/SHF on ECHO, with little that can be optimized in setting of ESRD in patient refusing dialysis     DVT " Prophylaxis: none d/t comfort goals.     CODE STATUS: Comfort Measures and Allow Natural Death (A-N-D)     Disposition: I expect the patient to be discharged to inpatient hospice.      Electronically signed by Lacy Denton II, DO, 06/04/18, 10:32 AM.

## 2018-06-04 NOTE — PLAN OF CARE
Problem: Palliative Care (Adult)  Goal: Maximized Comfort  Outcome: Ongoing (interventions implemented as appropriate)    Goal: Enhanced Quality of Life  Outcome: Ongoing (interventions implemented as appropriate)   06/04/18 1801   Palliative Care (Adult)   Enhanced Quality of Life making progress toward outcome

## 2018-06-04 NOTE — PROGRESS NOTES
Continued Stay Note  [facility]     Patient Name: Lindsey Tsai  MRN: [id number]  Today's Date: 6/4/2018    Admit Date: 5/15/2018          Discharge Plan     Row Name 06/04/18 1220       Plan    Plan Comfort Measures    Plan Comments Patient currently is being treated for comfort measures only. Palliative is following. Patient's apparently did not sign paperwork for Hospice. CM continues to follow patient's POC.               Discharge Codes    No documentation.       Expected Discharge Date and Time     Expected Discharge Date Expected Discharge Time    Jun 8, 2018             Cheryle Luevano

## 2018-06-04 NOTE — PROGRESS NOTES
"Palliative Care Progress Note    Date of Admission: 5/15/2018    Subjective:  Patient appears comfortable, resting in bed.   No family at bedside. Diminished po intake, noted overnight patient had decrease in body temperature.   Reviewed current scheduled and prn medications for route, type, dose, and frequency.     •  acetaminophen **OR** acetaminophen  •  bisacodyl  •  glycopyrrolate  •  hydrOXYzine  •  LORazepam  •  Morphine  •  ondansetron  •  sodium chloride  •  sodium chloride    Objective:  PPS 10%   BP (!) 89/61 (BP Location: Right arm, Patient Position: Lying)   Pulse 75   Temp 97.1 °F (36.2 °C) (Axillary)   Resp 16   Ht 163 cm (64.17\")   Wt 73.6 kg (162 lb 4.8 oz)   SpO2 98%   BMI 27.71 kg/m²    Intake & Output (last day)       06/03 0701 - 06/04 0700 06/04 0701 - 06/05 0700    P.O. 130 0    Total Intake(mL/kg) 130 (1.8) 0 (0)    Urine (mL/kg/hr) 95 (0.1)     Total Output 95      Net +35 0              Lab Results (last 24 hours)     ** No results found for the last 24 hours. **        Imaging Results (last 24 hours)     ** No results found for the last 24 hours. **          Physical Exam:  Gen: NAD, resting in bed  Skin: warm, dry   Eyes: KATHRINE, conjunctiva clear and moist   HEENT: oropharynx moist, clear  Resp/thorax: even effort, non labored, CTA   CV: RRR   ABD: soft, nondistended  : ferreira to bedside drainage   Ext: no edema, no redness   Neuro: unresponsive to name or touch,  no myoclonus       Reviewed labs and diagnostic results.   Lab Results   Component Value Date    HGBA1C 5.3 05/16/2018           Results from last 7 days  Lab Units 05/29/18  1247   SODIUM mmol/L 133   POTASSIUM mmol/L 4.6   CHLORIDE mmol/L 93*   CO2 mmol/L 19.0*   BUN mg/dL 130*   CREATININE mg/dL 5.70*   CALCIUM mg/dL 8.4*   BILIRUBIN mg/dL 0.7   ALK PHOS U/L 144*   ALT (SGPT) U/L 5*   AST (SGOT) U/L 13   GLUCOSE mg/dL 89       Impression: 72 y.o. female with CKD stage V declining HD, A fib, GIB, ICM EF 25%    Plan: "   General myofascial pain - continue to monitor discomfort with repositioning with severe immobility. Continue prn morphine    Dysphagia - no po intake, continue mouth moisturizers    Plan - comfort focused care at end of life  Requires nursing assessment and interventions for symptom management and patient is unable to communicate her needs.   No family at bedside.  Reviewed and updated nursing  Time: 30 minutes       ELIOT Langley  [phone]  06/04/18  5:58 PM

## 2018-06-04 NOTE — PLAN OF CARE
Problem: Palliative Care (Adult)  Goal: Maximized Comfort  Outcome: Ongoing (interventions implemented as appropriate)   06/04/18 1812   Palliative Care (Adult)   Maximized Comfort making progress toward outcome

## 2018-06-04 NOTE — PLAN OF CARE
Problem: Patient Care Overview  Goal: Interprofessional Rounds/Family Conf  Outcome: Ongoing (interventions implemented as appropriate)  Palliative Team Attendance 1300. Aaron LEE, Shabana Do RN [facility], Neha Leos MDIV Savanah Boucher RN ANDER,    06/04/18 1300   Interdisciplinary Rounds/Family Conf   Summary comfort measures. pt is actively dying. pain with any movement. schedule morphine and prn.        Problem: Dying Patient, Actively (Adult)  Goal: Identify Related Risk Factors and Signs and Symptoms  Outcome: Ongoing (interventions implemented as appropriate)    Goal: Comfort/Pain Control  Outcome: Ongoing (interventions implemented as appropriate)    Goal: Peace/Preservation of Dignity During the Dying Process  Outcome: Ongoing (interventions implemented as appropriate)

## 2018-06-05 NOTE — PLAN OF CARE
Problem: Palliative Care (Adult)  Intervention: Support/Optimize Psychosocial Response   06/05/18 5724   Coping/Psychosocial Interventions   Grieving Process Facilitation other (see comments)   Psychosocial Support   Family/Support System Care caregiver stress acknowledged;self-care encouraged;support provided   Discussion with patient's son at bedside - explored complex-complicated grief as he has lost his father, his brother very recently and is anticipating the loss of his mother in days.  Discussed hospice grief support available to him - he also states he is already seeing a counselor that has been helpful when his mother initially started to have her overall decline.  Discussed cremation and burial resources, provided with contact information for 2 cremation services.  Discussed the positive of being engaged with hospice prior to patient's death - it allows for better aftercare/bereavement care for him - he will consider.

## 2018-06-05 NOTE — PROGRESS NOTES
"    [facility] Medicine Services  PROGRESS NOTE    Patient Name: Lindsey Tsai  : 1945  MRN: [id number]    Date of Admission: 5/15/2018  Length of Stay: 20  Primary Care Physician: Vasquez Whalen MD    Subjective   Subjective     CC: f/u decline    HPI:   No events per nursing    Review of Systems  UTO due to AMS.    Otherwise ROS is negative except as mentioned in the HPI.    Objective   Objective     Vital Signs:   Temp:  [94.3 °F (34.6 °C)] 94.3 °F (34.6 °C)  Heart Rate:  [76-80] 76  Resp:  [14-16] 14  BP: (83-92)/(51-67) 92/51        Physical Exam:  Constitutional: Comfortable, asleep, briefly awakens  Respiratory: Clear to auscultation bilaterally, respiratory effort normal   Cardiovascular: RRR, no murmurs, rubs, or gallops, palpable pedal pulses bilaterally  Gastrointestinal: Positive bowel sounds, soft, nontender, nondistended  Musculoskeletal: No bilateral ankle edema  Psychiatric: quiet  Neurologic: briefly awakens to voice, nodes her head \"no\" when asked if she is in any pain    Results Reviewed:  I have personally reviewed current lab, radiology, and data and agree.          Microbiology Results Abnormal     Procedure Component Value - Date/Time    Urine Culture - Urine, Urine, Clean Catch [[id number]]  (Abnormal)  (Susceptibility) Collected:  18 0145    Lab Status:  Final result Specimen:  Urine from Urine, Clean Catch Updated:  18 1332     Urine Culture >100,000 CFU/mL Pseudomonas aeruginosa (A)     Comment: Morphological Type 1           >100,000 CFU/mL Pseudomonas aeruginosa (A)     Comment: Morphological Type 2         Susceptibility      Pseudomonas aeruginosa (1)    Pseudomonas aeruginosa (2)       BENEDICT    BENEDICT       Amikacin       <=16 ug/ml Susceptible       Aztreonam 16 ug/ml Intermediate    <=8 ug/ml Susceptible       Cefepime <=8 ug/ml Susceptible    <=8 ug/ml Susceptible       Ceftazidime 4 ug/ml Susceptible    4 ug/ml Susceptible       " Gentamicin <=4 ug/ml Susceptible    >8 ug/ml Resistant       Levofloxacin <=2 ug/ml Susceptible    >4 ug/ml Resistant       Meropenem <=1 ug/ml Susceptible    4 ug/ml Susceptible       Piperacillin + Tazobactam <=16 ug/ml Susceptible    <=16 ug/ml Susceptible       Tobramycin <=4 ug/ml Susceptible    >8 ug/ml Resistant                      Eosinophil Smear - Urine, Urine, Clean Catch [[id number]]  (Normal) Collected:  05/16/18 1105    Lab Status:  Final result Specimen:  Urine from Urine, Clean Catch Updated:  05/16/18 1215     Eosinophil Smear 0 % EOS/100 Cells     Narrative:       No eosinophil seen          Results for orders placed during the hospital encounter of 05/15/18   Adult Transthoracic Echo Complete W/ Cont if Necessary Per Protocol    Narrative · Right ventricular cavity is borderline dilated.  · Moderately reduced right ventricular systolic function noted.  · Left atrial cavity size is mildly dilated.  · Moderate-to-severe mitral valve regurgitation is present  · Moderate tricuspid valve regurgitation is present.  · Mild pulmonic valve regurgitation is present.  · Left ventricular systolic function is severely decreased. Estimated EF =   20%.          I have reviewed the medications.    Assessment/Plan   Assessment / Plan     Hospital Problem List     * (Principal)GI bleed    CAD (coronary artery disease)    Chronic systolic heart failure    Overview Signed 5/15/2018 11:16 PM by Zack Mitchell MD     EF 20-30% 3/2018         Type 2 diabetes mellitus    CKD (chronic kidney disease), stage IV    Acute blood loss anemia    Hypothyroidism    Decubitus ulcer, bilateral heels and coccyx    PAF (paroxysmal atrial fibrillation)    Acute GI bleeding    Overview Signed 5/17/2018  9:02 AM by TESFAYE Archibald     Added automatically from request for surgery [id number]         History of duodenal ulcer    Overview Signed 5/17/2018  9:02 AM by TESFAYE Archibald     Added automatically from request for surgery  [id number]               Brief Hospital Course to date:  Lindsey Tsai is a 72 y.o. female PMH of CAD, ICM EF 25%, PAF, DM, CKD V, recent GIB, decubiti, Hypothyroidism, N[facility] - [facility] admitted 5/15/2018 w/ GIB and Hgb 5.  EGD normal.  Bleeding seems to have resolved.  Despite having ESRD patient refuses dialysis or further renal work-up. She continues to actively decline     Assessment & Plan:   --actively dying  --long discussion with son at bedside, he currently does not want hospice services  --will continue palliative care services, appreciate assistance  --continue comfort only medications      DVT Prophylaxis: none d/t comfort goals.     CODE STATUS: Comfort Measures and Allow Natural Death (A-N-D)     Disposition: I expect the patient to be discharged to inpatient hospice.    More than 50% of time spent counseling on current illness and plan of care. Case discussed with: nursing, family, palliative  Total time spent face to face with the patient was 20 minutes.  Total time of the encounter was 35 minutes.    Electronically signed by ELIOT Mendez, 06/05/18, 1:47 PM.

## 2018-06-05 NOTE — PLAN OF CARE
Problem: Patient Care Overview  Goal: Plan of Care Review  Outcome: Ongoing (interventions implemented as appropriate)   06/05/18 0255   Coping/Psychosocial   Plan of Care Reviewed With patient   Plan of Care Review   Progress no change       Problem: Wound (Includes Pressure Injury) (Adult)  Goal: Signs and Symptoms of Listed Potential Problems Will be Absent, Minimized or Managed (Wound)  Outcome: Ongoing (interventions implemented as appropriate)      Problem: Palliative Care (Adult)  Goal: Maximized Comfort  Outcome: Ongoing (interventions implemented as appropriate)    Goal: Enhanced Quality of Life  Outcome: Ongoing (interventions implemented as appropriate)    Goal: Identify Related Risk Factors and Signs and Symptoms  Outcome: Ongoing (interventions implemented as appropriate)    Goal: Maximized Comfort  Outcome: Ongoing (interventions implemented as appropriate)      Problem: Dying Patient, Actively (Adult)  Goal: Identify Related Risk Factors and Signs and Symptoms  Outcome: Outcome(s) achieved Date Met: 06/05/18    Goal: Comfort/Pain Control  Outcome: Ongoing (interventions implemented as appropriate)    Goal: Peace/Preservation of Dignity During the Dying Process  Outcome: Ongoing (interventions implemented as appropriate)

## 2018-06-05 NOTE — PROGRESS NOTES
"Palliative Care Progress Note    Date of Admission: 5/15/2018    Subjective: Son at bedside.   Patient able to answer yes/no questions minimally. No pain, no dyspnea.   Reviewed current scheduled and prn medications for route, type, dose, and frequency.     •  acetaminophen **OR** acetaminophen  •  bisacodyl  •  glycopyrrolate  •  hydrOXYzine  •  LORazepam  •  Morphine  •  ondansetron  •  sodium chloride  •  sodium chloride    Objective:  PPS 10%   BP 92/51 (BP Location: Right arm, Patient Position: Lying)   Pulse 76   Temp 94.3 °F (34.6 °C) (Rectal)   Resp 14   Ht 163 cm (64.17\")   Wt 73.6 kg (162 lb 4.8 oz)   SpO2 100%   BMI 27.71 kg/m²    Intake & Output (last day)       06/04 0701 - 06/05 0700 06/05 0701 - 06/06 0700    P.O. 80 0    Total Intake(mL/kg) 80 (1.1) 0 (0)    Urine (mL/kg/hr)      Total Output        Net +80 0              Lab Results (last 24 hours)     ** No results found for the last 24 hours. **        Imaging Results (last 24 hours)     ** No results found for the last 24 hours. **          Physical Exam:  Gen: NAD, appears restful, resting in bed  Skin: warm, dry   Eyes: KATHRINE, conjunctiva clear and moist   Resp/thorax: even effort, non labored, CTA   CV: RRR   ABD: soft, non distended  : ferreira to bedside drainage with clear stokes urine  Ext: no edema, no redness   Neuro: opens eyes to name, no myoclonus       Reviewed labs and diagnostic results.   Lab Results   Component Value Date    HGBA1C 5.3 05/16/2018     Impression: 72 y.o. female with CKD stage V declining HD, A fib, GIB, ICM EF 25%  Plan:   General myofascial pain - noted per staff patient has discomfort with repositioning, exhibited with moaning and frowns.   Patient has received 3 doses in last 24 hours.   Will schedule every 6 hours, continue prns.     No po intake - continue mouth care and moisturizer    Plan - comfort based care at the end of life.     Requires nursing assessment and interventions for symptom management " with injectable medications. Patient is unable to communicate her needs.   Provided support to the son with anticipatory grief,  assisted with answering questions about burial plans and options with body disposition.       Time: 30 minutes   > 50% time spent in counseling and education concerning current orders for symptom management with son    Martina Caruso, APRN  [phone]  06/05/18  5:05 PM

## 2018-06-05 NOTE — PLAN OF CARE
Problem: Patient Care Overview  Goal: Plan of Care Review  Outcome: Ongoing (interventions implemented as appropriate)   06/05/18 1225   Coping/Psychosocial   Plan of Care Reviewed With son   Plan of Care Review   Progress declining   OTHER   Outcome Summary pt is actively dying. comfort measures. pain with movement. schedule morphine. son concerned about burial. sw to talk with options.

## 2018-06-05 NOTE — PROGRESS NOTES
"Continued Stay Note  [facility]     Patient Name: Lindsey Tsai  MRN: [id number]  Today's Date: 6/5/2018    Admit Date: 5/15/2018          Discharge Plan     Row Name 06/05/18 1527       Plan    Plan Actively dying    Plan Comments Chart reviewed.  Resting quietly.  No obvious distress. PPS 10%. Palliative Care LCSW visiting with son at bedside.  Per LCSW - discussed son's complicated grief issues - multiple losses..  She encouraged hospice contact for support for grief services and support.....\"stated he would think about it\".  Following for hospice support and comfort assessment for pt.  If can be of further assist please contact.....ext [phone].              Discharge Codes    No documentation.       Expected Discharge Date and Time     Expected Discharge Date Expected Discharge Time    Jun 8, 2018             Savanah Boucher RN    "

## 2018-06-06 NOTE — PLAN OF CARE
Problem: Dying Patient, Actively (Adult)  Goal: Identify Related Risk Factors and Signs and Symptoms  Outcome: Ongoing (interventions implemented as appropriate)    Goal: Comfort/Pain Control  Outcome: Ongoing (interventions implemented as appropriate)    Goal: Peace/Preservation of Dignity During the Dying Process  Outcome: Ongoing (interventions implemented as appropriate)

## 2018-06-06 NOTE — PLAN OF CARE
Problem: Patient Care Overview  Goal: Interprofessional Rounds/Family Conf  Outcome: Ongoing (interventions implemented as appropriate)  Palliative Team Conference: TOYA Holcomb RN, CHPN; GUMARO Auguste LCSW, [facility]; COLLEEN Clarke DO; BRAULIO Caruso, APRN; ESPERANZA Boucher, RN, CHPN; ROSALES Leos MDiv   06/06/18 1330   Interdisciplinary Rounds/Family Conf   Summary Pt transitioning today to inpatient hospice at [facility].

## 2018-06-06 NOTE — DISCHARGE SUMMARY
[facility] Medicine Services  DISCHARGE SUMMARY    Patient Name: Lindsey Tsai  : 1945  MRN: [id number]    Date of Admission: 5/15/2018  Date of Discharge:  2018  Primary Care Physician: Vasquez Whalen MD    Consults     Date and Time Order Name Status Description    2018 0943 Inpatient Cardiology Consult Completed     2018 1359 Inpatient Palliative Care MD Consult Completed     2018 1002 Inpatient Nephrology Consult      2018 0108 Inpatient Gastroenterology Consult Completed         Hospital Course     Presenting Problem:   Acute GI bleeding [K92.2]  History of duodenal ulcer [Z87.19]  Acute GI bleeding [K92.2]    Active Hospital Problems (** Indicates Principal Problem)    Diagnosis Date Noted   • **GI bleed [K92.2] 05/15/2018   • CAD (coronary artery disease) [I25.10] 05/15/2018   • Chronic systolic heart failure [I50.22] 05/15/2018   • Type 2 diabetes mellitus [E11.9] 05/15/2018   • CKD (chronic kidney disease), stage IV [N18.4] 05/15/2018   • Acute blood loss anemia [D62] 05/15/2018   • Hypothyroidism [E03.9] 05/15/2018   • Decubitus ulcer, bilateral heels and coccyx [L89.90] 05/15/2018   • PAF (paroxysmal atrial fibrillation) [I48.0] 05/15/2018   • Acute GI bleeding [K92.2] 05/15/2018   • History of duodenal ulcer [Z87.19] 05/15/2018      Resolved Hospital Problems    Diagnosis Date Noted Date Resolved   No resolved problems to display.      Hospital Course:  Lindsey Tsai is a 72 y.o. female with past medical history of CAD, ICM EF 25%, PAF, DM, CKD V, recent GIB, decubitus, hypothyroid, nursing home resident at [facility] that was admitted on 5/15/18 with GIB and hemoglobin 5.  Patient was initially admitted to the ICU and transferred to the floor on 2018.  She required multiple blood transfusion and gastroenterology was consulted.  She had an EGD on 2018 that was normal.  Patient has declined during hospitalization with  severe CHF and end stage renal disease.  Patient stated that she did not want dialysis.  Palliative care was consulted and patient code status was changed to AND and she was transferred to the floor.  Nielsen catheter was placed secondary to decubitus wounds and she was treated for 7 days with Cefepime for PSA in urine.  Hospice and palliative care have followed during hospitalization while plans for discharge and goals of care were being discussed with son and patient.  Patient has continued to decline and decision has been made by her son to transition to inpatient hospice.  Patient will be admitted by hospice today for end of life care.      Procedure(s):  ESOPHAGOGASTRODUODENOSCOPY       Day of Discharge     HPI:   Patient sleeping in bed minimally responsive to voice and touch.  Appears comfortable.  Son is at bedside.  Asking a lot of questions about hospice.      Review of Systems  UTO due to AMS    Otherwise ROS is negative except as mentioned in the HPI.    Vital Signs:      Vitals:    06/05/18 0750   BP: 92/51   Pulse: 76   Resp: 14   Temp:    SpO2: 100%     Physical Exam:  Constitutional: Appears comfortable, sleeping, son at bedside  Respiratory: Clear to auscultation bilaterally, respiratory effort normal   Cardiovascular: RRR, no murmurs, rubs, or gallops, palpable pedal pulses bilaterally  Gastrointestinal: Positive bowel sounds, soft, nontender, nondistended  Musculoskeletal: No bilateral ankle edema  Psychiatric: quiet  Neurologic: sleeping, actively dying    Pertinent  and/or Most Recent Results         Brief Urine Lab Results  (Last result in the past 365 days)      Color   Clarity   Blood   Leuk Est   Nitrite   Protein   CREAT   Urine HCG        05/16/18 1105             37.9           Microbiology Results Abnormal     Procedure Component Value - Date/Time    Urine Culture - Urine, Urine, Clean Catch [[id number]]  (Abnormal)  (Susceptibility) Collected:  05/16/18 0145    Lab Status:  Final result  Specimen:  Urine from Urine, Clean Catch Updated:  05/19/18 1332     Urine Culture >100,000 CFU/mL Pseudomonas aeruginosa (A)     Comment: Morphological Type 1           >100,000 CFU/mL Pseudomonas aeruginosa (A)     Comment: Morphological Type 2         Susceptibility      Pseudomonas aeruginosa (1)    Pseudomonas aeruginosa (2)       BENEDICT    BENEDICT       Amikacin       <=16 ug/ml Susceptible       Aztreonam 16 ug/ml Intermediate    <=8 ug/ml Susceptible       Cefepime <=8 ug/ml Susceptible    <=8 ug/ml Susceptible       Ceftazidime 4 ug/ml Susceptible    4 ug/ml Susceptible       Gentamicin <=4 ug/ml Susceptible    >8 ug/ml Resistant       Levofloxacin <=2 ug/ml Susceptible    >4 ug/ml Resistant       Meropenem <=1 ug/ml Susceptible    4 ug/ml Susceptible       Piperacillin + Tazobactam <=16 ug/ml Susceptible    <=16 ug/ml Susceptible       Tobramycin <=4 ug/ml Susceptible    >8 ug/ml Resistant                      Eosinophil Smear - Urine, Urine, Clean Catch [[id number]]  (Normal) Collected:  05/16/18 1105    Lab Status:  Final result Specimen:  Urine from Urine, Clean Catch Updated:  05/16/18 1215     Eosinophil Smear 0 % EOS/100 Cells     Narrative:       No eosinophil seen          Imaging Results (all)     Procedure Component Value Units Date/Time    XR Chest 1 View [[id number]] Collected:  05/18/18 0948     Updated:  05/18/18 1148    Narrative:       EXAMINATION: XR CHEST 1 VW-05/18/2018:      INDICATION: CHF; K92.2-Gastrointestinal hemorrhage, unspecified;  Z87.19-Personal history of other diseases of the digestive system;  I82.90-Acute embolism and thrombosis of unspecified vein; N18.9-Chronic  kidney disease, unspecified; R68.89-Other general symptoms and signs;  L89.154-Pressure ulcer of sacral region, stage 4; I42.9-Cardiomyopathy,  unspecified; I95.89-Other hypotension.      COMPARISON: NONE.     FINDINGS: The heart is large. There are features consistent with  pulmonary edema, vascular congestion, basilar  and airspace changes and  sizable pleural effusions.        Impression:       Pulmonary edema with sizable bilateral pleural effusions.     D:  05/18/2018  E:  05/18/2018     This report was finalized on 5/18/2018 11:46 AM by Dr. Haider Pal MD.       US Renal Limited [[id number]] Collected:  05/17/18 0936     Updated:  05/17/18 1107    Narrative:       EXAMINATION: US RENAL, LIMITED-05/16/2018:     INDICATION: CKD; K92.2-Gastrointestinal hemorrhage, unspecified;  Z87.19-Personal history of other diseases of the digestive system;  I82.90-Acute embolism and thrombosis of unspecified vein; N18.9-Chronic  kidney disease, unspecified; R68.89-Other general symptoms and signs;  L89.154-Pressure ulcer of sacral region, stage 4; I42.9-Cardiomyopathy,  unspecified; I95.89-Other hypotension.         COMPARISON: NONE.     FINDINGS:   1. The right kidney measures 12 x 4.1 x 5.8 cm. There is 1.3 cm of  cortical thickness. The right kidney is smooth. There is no  hydronephrosis, mass or high grade atrophy.  2. The left kidney measures 10.9 x 4.5 x 5.2 cm. There is 1.3 cm of  cortical thickness. There is no evidence of left renal mass or  hydronephrosis. There is a linear echogenic focus at the junction of the  middle and lower third of the left kidney which is nonobstructing. This  could be vascular or nonobstructing stone.  3. There is a Nielsen catheter in the bladder. The bladder is  decompressed. The patient does have free fluid in the pelvis, minimal  amount.  4. Incidental note is made of echogenic focus in the gallbladder  consistent with a gallstone. The gallbladder wall thickness is normal  and the common duct is 5.6 mm.       Impression:       1.  Normal size kidneys with no evidence of parenchymal loss or high  grade atrophy. There is no obstructive hydronephrosis.  2.  There is an echogenic focus in the left kidney, nonobstructing as  described.  3.  The bladder is decompressed.  4.  Incidental note is made of  gallstone.     D:  05/17/2018  E:  05/17/2018            This report was finalized on 5/17/2018 11:05 AM by Dr. Alexander Groves MD.             Results for orders placed during the hospital encounter of 05/15/18   Adult Transthoracic Echo Complete W/ Cont if Necessary Per Protocol    Narrative · Right ventricular cavity is borderline dilated.  · Moderately reduced right ventricular systolic function noted.  · Left atrial cavity size is mildly dilated.  · Moderate-to-severe mitral valve regurgitation is present  · Moderate tricuspid valve regurgitation is present.  · Mild pulmonic valve regurgitation is present.  · Left ventricular systolic function is severely decreased. Estimated EF =   20%.          Discharge Details     Medications per Hospice     Discharge Disposition:  Hospice/Medical Facility ([facility], [place], operated by [facility])        Time Spent on Discharge:  40 minutes    Electronically signed by ELIOT Martinez, 06/06/18, 1:41 PM.

## 2018-06-06 NOTE — H&P
Hospice History and Physical     Patient Name:  Lindsey Tsai   : 1945   Sex: female    Patient Care Team:  Vasquez Whalen MD as PCP - General (Family Medicine)    Code Status: comfort measures    Subjective     Patient is a 72 y.o. female admitted to the hospital on 18 for GIB and Anemia (Hgb 5). EGD normal, bleeding resolved, self-limiting (GI signed off). Pt with ESRD and has declined dialysis or further renal work-up (Nephrology signed off). CHF / Afib /severe CM/ HFrEF. Hypotension. Cardiorenal syndrome. Decline inevitable in the setting of ESRD without dialysis.    PMHx: CAD, ICM EF 25%, PAF, DM, CKD V, recent GIB, buttocks pressure injury, hypothyroid, ESRD    Pt has continued to decline throughout hospitalization and ultimately family elected for comfort measures with inpt Hospice. Pt was admitted to inpt Hospice on 18 for mgmt of acute symptoms (dyspnea, pain, anorexia).      Review of Systems  Review of Systems   Unable to perform ROS: Patient nonverbal       History  Past Medical History:   Diagnosis Date   • CAD (coronary artery disease)    • Cardiomyopathy    • Decubitus ulcer    • Depression    • Diabetes mellitus    • Disease of thyroid gland    • GI bleed    • Heart failure    • Normocytic anemia    • Reduced ejection fraction concurrent with and due to acute on chronic heart failure    • Renal disorder      Past Surgical History:   Procedure Laterality Date   • ENDOSCOPY N/A 2018    Procedure: ESOPHAGOGASTRODUODENOSCOPY;  Surgeon: Pancho Ackerman MD;  Location: [facility] ENDOSCOPY;  Service: Gastroenterology     Current Facility-Administered Medications   Medication Dose Route Frequency Provider Last Rate Last Dose   • acetaminophen (TYLENOL) tablet 650 mg  650 mg Oral Q4H PRN ELIOT Raya        Or   • acetaminophen (TYLENOL) suppository 650 mg  650 mg Rectal Q4H PRN ELIOT Raya       • bisacodyl (DULCOLAX) suppository 10 mg  10 mg Rectal Daily  PRN Anupama Andre MD       • glycopyrrolate (ROBINUL) injection 0.4 mg  0.4 mg Intravenous Q6H PRN Anupama Andre MD       • haloperidol lactate (HALDOL) injection 1 mg  1 mg Intravenous Q4H PRN Anupama Andre MD       • LORazepam (ATIVAN) injection 0.25 mg  0.25 mg Intravenous Q4H PRN Flaquita Bhagat, APRJN       • LORazepam (ATIVAN) injection 0.5 mg  0.5 mg Intravenous Q4H PRN Anupama Andre MD       • Morphine sulfate (PF) injection 1 mg  1 mg Intravenous Q2H PRN Flaquita Bhagat, APRN       • Morphine sulfate (PF) injection 1 mg  1 mg Intravenous Q6H Flaquita SHIRA Bhagat, APRN       • ondansetron (ZOFRAN) injection 4 mg  4 mg Intravenous Q6H PRN Flaquita Bhagat, APRN       • palliative care oral rinse   Mouth/Throat Q6H Flaquita SHIRA Bhagat, APRJN            •  acetaminophen **OR** acetaminophen  •  bisacodyl  •  glycopyrrolate  •  haloperidol lactate  •  LORazepam  •  LORazepam  •  Morphine  •  ondansetron  Allergies   Allergen Reactions   • Codeine Unknown (See Comments)     Pt does not recall.      No family history on file.  Social History     Social History   • Marital status:      Spouse name: N/A   • Number of children: N/A   • Years of education: N/A     Occupational History   • Not on file.     Social History Main Topics   • Smoking status: Former Smoker   • Smokeless tobacco: Not on file   • Alcohol use No   • Drug use: No   • Sexual activity: Not on file     Other Topics Concern   • Not on file     Social History Narrative   • No narrative on file       Objective     Vital Signs  Temp:  [94.3 °F (34.6 °C)] 94.3 °F (34.6 °C)  Heart Rate:  [76] 76  Resp:  [14] 14  BP: (92)/(51) 92/51    PPS: Palliative Performance Scale score as of 6/6/2018, 2:41 PM is 20% based on the following measures:   Ambulation: Totally bed bound  Activity and Evidence of Disease: Unable to do any work, extensive evidence of disease  Self-Care: Total care  Intake:Minimal sips  LOC: Full, drowsy or  confusion      Physical Exam:  Physical Exam   Constitutional: No distress.   Pt resting with eyes closed; awakens to name then closes eyes again   HENT:   + temporal wasting   Eyes:   Remained closed   Neck: No JVD present. No tracheal deviation present.   Cardiovascular: Normal rate and regular rhythm.    No murmur heard.  HR 70s   Pulmonary/Chest: Effort normal and breath sounds normal.   Musculoskeletal: She exhibits no edema.   Neurological:   Does not follow commands - per notes, yesterday pt weak but able to respond yes/no   Skin: Skin is dry. She is not diaphoretic. There is pallor.   Psychiatric:   Facial grimacing         Results Review:   Lab Results   Component Value Date    HGBA1C 5.3 05/16/2018       Lab Results   Component Value Date    GLUCOSE 89 05/29/2018     (H) 05/29/2018    CREATININE 5.70 (H) 05/29/2018    EGFRIFNONA 7 (L) 05/29/2018    BCR 22.8 05/29/2018    K 4.6 05/29/2018    CO2 19.0 (L) 05/29/2018    CALCIUM 8.4 (L) 05/29/2018    ALBUMIN 3.00 (L) 05/29/2018    LABIL2 0.8 (L) 05/29/2018    AST 13 05/29/2018    ALT 5 (L) 05/29/2018         Principal Problem:    CAD (coronary artery disease)  Active Problems:    Chronic systolic heart failure    Type 2 diabetes mellitus    GI bleed    Acute blood loss anemia    Hypothyroidism    Decubitus ulcer, bilateral heels and coccyx    PAF (paroxysmal atrial fibrillation)      Assessment/Plan   Assessment/Plan:     Dyspnea - low dose scheduled opioid and prn - ESRD - monitor for side effects    Pain, myofascial, buttocks, existential - patient has discomfort with repositioning, exhibited with moaning and frowns. Continue with ferreira placement for comfort d/t pressure injuries on buttocks. Pt does moan when she is in pain.     Anorexia - pt declines intake, meals trays provided. Palliative mouth rinse ordered.     Total Visit Time: 60 min  Face to Face Time: 20 min    Justification for care:  Patient meets criteria for acute in-patient care with  required nursing assessment and interventions for symptoms with IV medications.      ELIOT Coleman  (C) [phone]  (O) [phone]  06/06/18  2:41 PM

## 2018-06-06 NOTE — PROGRESS NOTES
Continued Stay Note  [facility]     Patient Name: Lindsey Tsai  MRN: [id number]  Today's Date: 6/6/2018    Admit Date: 5/15/2018          Discharge Plan     Row Name 06/06/18 0823       Plan    Plan Comfort Measures     Patient/Family in Agreement with Plan yes    Plan Comments Patient is comfort measures only. CM continues to follow.               Discharge Codes    No documentation.       Expected Discharge Date and Time     Expected Discharge Date Expected Discharge Time    Jun 8, 2018             Cheryle Luevaon

## 2018-06-06 NOTE — PROGRESS NOTES
Continued Stay Note  [facility]     Patient Name: Lindsey Tsai  MRN: [id number]  Today's Date: 6/6/2018    Admit Date: 5/15/2018          Discharge Plan     Row Name 06/06/18 1323       Plan    Plan [facility] inpatient hospice    Plan Comments Chart reviewed.  Call received from Leatha OZUNA with palliative team that son was in house and ready to proceed with inpatient admission to hospice.  Hospice RN and SW met with Alexander hamlin and discussed inpatient hospice services.  He is electing this benefit.  Admission paperwork signed with hospice SW.  Assessment done.  Pt minimally responsive to voice and touch.  Verbal order received from Dr. Huggins to discharge patient from hospitalist service.  Pt then readmitted to in hospice service under Dr. Anupama Andre.  Pt will receive GIP hospice care for skilled nursing assessment for nonverbal signs of pain/disocomfort, interventions for symptom mgmt of pain, dyspnea, and restlessness with scheduled and prn IV med administration, and monitoring for effectiveness of these meds.  Staff nurse and  will be made aware of plan.  If hospice team can be of further assist, call ext [phone].               Discharge Codes    No documentation.       Expected Discharge Date and Time     Expected Discharge Date Expected Discharge Time    Jun 8, 2018             Leyla Lewis RN

## 2018-06-07 NOTE — PLAN OF CARE
Problem: Dying Patient, Actively (Adult)  Goal: Identify Related Risk Factors and Signs and Symptoms  Outcome: Ongoing (interventions implemented as appropriate)   06/06/18 1452   Dying Patient, Actively (Adult)   Related Risk Factors (Actively Dying Patient) age/developmental level;disease progression   Signs and Symptoms (Actively Dying Patient) awareness reduced;changes in bladder elimination pattern;changes in bowel elimination pattern;total dependency;food/fluid disinterest/withdrawal;profound weakness     Goal: Comfort/Pain Control  Outcome: Ongoing (interventions implemented as appropriate)   06/06/18 1452   Dying Patient, Actively (Adult)   Comfort/Pain Control making progress toward outcome     Goal: Peace/Preservation of Dignity During the Dying Process  Outcome: Ongoing (interventions implemented as appropriate)   06/06/18 1452   Dying Patient, Actively (Adult)   Peace/Preservation of Dignity During the Dying Process making progress toward outcome       Problem: Fall Risk (Adult)  Goal: Identify Related Risk Factors and Signs and Symptoms  Outcome: Ongoing (interventions implemented as appropriate)   06/07/18 0259   Fall Risk (Adult)   Related Risk Factors (Fall Risk) age-related changes;sensory deficits     Goal: Absence of Fall  Outcome: Ongoing (interventions implemented as appropriate)   06/07/18 0259   Fall Risk (Adult)   Absence of Fall making progress toward outcome

## 2018-06-07 NOTE — PROGRESS NOTES
"Hospice Progress Note    Patient Name: Lindsey Tsai   : 1945  Gender: female    Code Status: comfort measures    Date of Admission: 2018    Subjective:    Pt resting comfortably at visit; unresponsive    Son at bedside.     - PRNs: morphine @1500 and 0430    - Held: none    - BM:       ROS:  Review of Systems   Unable to perform ROS: Patient unresponsive       Reviewed current scheduled and prn medications for route, type, dose and frequency.     •  acetaminophen **OR** acetaminophen  •  bisacodyl  •  glycopyrrolate  •  haloperidol lactate  •  LORazepam  •  Morphine  •  ondansetron    Objective:   BP 92/51 (BP Location: Right arm, Patient Position: Lying)   Pulse 76   Temp 94.3 °F (34.6 °C) (Rectal)   Resp 14   Ht 163 cm (64.17\")   Wt 73.6 kg (162 lb 4.1 oz)   SpO2 100%   BMI 27.70 kg/m²    Intake & Output (last day)        07 -  07 07 -  0700    P.O.  0    Total Intake(mL/kg)  0 (0)    Net   0              Lab Results (last 24 hours)     ** No results found for the last 24 hours. **        Imaging Results (last 24 hours)     ** No results found for the last 24 hours. **          PPS: Palliative Performance Scale score as of 2018, 10:34 PM is 10% based on the following measures:   Ambulation: Totally bed bound  Activity and Evidence of Disease: Unable to do any work, extensive evidence of disease  Self-Care: Total care  Intake:  Mouth care only  LOC: Drowsy or coma      Physical Exam  Constitutional: No distress.   Resting with eyes closed; undisturbed by exam  HENT:   + temporal wasting   Eyes:   Remained closed   Neck: No JVD present. No tracheal deviation present.   Cardiovascular: Normal rate and regular rhythm.    No murmur heard.  HR 70s   Pulmonary/Chest: Effort normal and breath sounds normal.   Musculoskeletal: She exhibits no edema.   Neurological:   Does not follow commands   Skin: Skin is dry. She is not diaphoretic. There is pallor.   Psychiatric: "   No Facial grimacing         Principal Problem:    CAD (coronary artery disease)  Active Problems:    Chronic systolic heart failure    Type 2 diabetes mellitus    GI bleed    Acute blood loss anemia    Hypothyroidism    Decubitus ulcer, bilateral heels and coccyx    PAF (paroxysmal atrial fibrillation)      Assessment/Plan:     Continue current plan of care    Bladder scan q shift - pt has ferreira, ?abd discomfort upon exam and no recorded output; hx A/C renal insufficiency  Update: pt does have recorded urine output this evening    Monitor for increased need    Total Visit Time: 35 min  Face to Face Time: 20 min    Justification for care:  Patient meets criteria for acute in-patient care with required nursing assessment and interventions for symptoms with IV medications.      Flaquita Bhagat, ELIOT  (C) [phone] (O) [phone]  06/07/18  10:10 AM

## 2018-06-07 NOTE — PROGRESS NOTES
Continued Stay Note  [facility]     Patient Name: Lindsey Tsai  MRN: [id number]  Today's Date: 6/7/2018    Admit Date: 6/6/2018          Discharge Plan     Row Name 06/07/18 1057       Plan    Plan [facility] inpatient hospice    Plan Comments Chart reviewed.  Patient resting comfortably at this time.  Son is at bedside.  States that patient hasn't been as responsive today she was yesterday.  Noted with very minimal urine output.  Staff nurse to bladder scan patient.  Wound care will be changed to prn.  Patient continues to require GIP hospice care for skilled nursing assessment for nonverbal signs of pain and discomfort, interventions for symptom management of pain, dyspnea, and anxiety with scheduled and prn IV med administration, and monitoring for effectiveness of these meds.  If hospice team can be of further assist call extension [phone].              Discharge Codes    No documentation.           Leyla Lewis RN

## 2018-06-08 NOTE — PLAN OF CARE
Problem: Dying Patient, Actively (Adult)  Goal: Identify Related Risk Factors and Signs and Symptoms  Outcome: Ongoing (interventions implemented as appropriate)

## 2018-06-08 NOTE — PROGRESS NOTES
Continued Stay Note  [facility]     Patient Name: Lindsey Tsai  MRN: [id number]  Today's Date: 6/8/2018    Admit Date: 6/6/2018          Discharge Plan     Row Name 06/08/18 1655       Plan    Plan [facility] inpatient hospice    Plan Comments Chart reviewed.  Patient resting comfortably with eyes closed.  No family at bedside.  Undisturbed by exam.  Given when necessary Robinul overnight.  Patient continues to require GIP hospice care for skilled nursing assessment for nonverbal signs of pain and discomfort, interventions for symptom management of pain, dyspnea, and restlessness with scheduled and when necessary IV med administration, and monitoring for effectiveness of these meds.  If hospice team can be of further assist call extension [phone].              Discharge Codes    No documentation.           Leyla Lewis RN

## 2018-06-08 NOTE — PLAN OF CARE
Problem: Dying Patient, Actively (Adult)  Goal: Identify Related Risk Factors and Signs and Symptoms  Outcome: Ongoing (interventions implemented as appropriate)   06/06/18 1452   Dying Patient, Actively (Adult)   Related Risk Factors (Actively Dying Patient) age/developmental level;disease progression   Signs and Symptoms (Actively Dying Patient) awareness reduced;changes in bladder elimination pattern;changes in bowel elimination pattern;total dependency;food/fluid disinterest/withdrawal;profound weakness      06/06/18 1452   Dying Patient, Actively (Adult)   Related Risk Factors (Actively Dying Patient) age/developmental level;disease progression   Signs and Symptoms (Actively Dying Patient) awareness reduced;changes in bladder elimination pattern;changes in bowel elimination pattern;total dependency;food/fluid disinterest/withdrawal;profound weakness     Goal: Comfort/Pain Control  Outcome: Ongoing (interventions implemented as appropriate)   06/06/18 1452   Dying Patient, Actively (Adult)   Comfort/Pain Control making progress toward outcome     Goal: Peace/Preservation of Dignity During the Dying Process  Outcome: Ongoing (interventions implemented as appropriate)   06/06/18 1452   Dying Patient, Actively (Adult)   Peace/Preservation of Dignity During the Dying Process making progress toward outcome       Problem: Fall Risk (Adult)  Goal: Identify Related Risk Factors and Signs and Symptoms  Outcome: Ongoing (interventions implemented as appropriate)   06/07/18 0259   Fall Risk (Adult)   Related Risk Factors (Fall Risk) age-related changes;sensory deficits      06/07/18 0259 06/08/18 0331   Fall Risk (Adult)   Related Risk Factors (Fall Risk) age-related changes;sensory deficits --    Signs and Symptoms (Fall Risk) --  presence of risk factors     Goal: Absence of Fall  Outcome: Ongoing (interventions implemented as appropriate)   06/07/18 0259   Fall Risk (Adult)   Absence of Fall making progress toward  outcome

## 2018-06-08 NOTE — PROGRESS NOTES
"Hospice Progress Note    Date of Admission: 6/6/2018    Subjective:   Chart reviewed.   Events since last visit noted.  Meds reviewed for dose, route, and frequency.  PRN administration noted.    Objective:  PPS 10%   BP 92/51 (BP Location: Right arm, Patient Position: Lying)   Pulse 76   Temp 94.3 °F (34.6 °C) (Rectal)   Resp 14   Ht 163 cm (64.17\")   Wt 73.6 kg (162 lb 4.1 oz)   SpO2 100%   BMI 27.70 kg/m²    Intake & Output (last day)       06/07 0701 - 06/08 0700 06/08 0701 - 06/09 0700    P.O. 0     Other 100     Total Intake(mL/kg) 100 (1.4)     Urine (mL/kg/hr) 410 (0.2)     Total Output 410      Net -310                Lab Results (last 24 hours)     ** No results found for the last 24 hours. **        Imaging Results (last 24 hours)     ** No results found for the last 24 hours. **          Physical Exam:  Gen: NAD, resting in bed  Skin: warm, dry   Eyes: no scleral icterus, conjunctiva clear and moist   Resp/thorax: even effort, non labored  CV: RRR   ABD: soft, non distended  : ferreira to bedside drainage  Ext: no edema, no redness   Neuro: not awake or alert, no myoclonus       Reviewed labs and diagnostic results.   Lab Results   Component Value Date    HGBA1C 5.3 05/16/2018     Impression: 72 y.o. female with CKD stage V declining HD, A fib, GIB, ICM EF 25%    Plan:   General myofascial pain - scheduled and PRN opiate.  Premed for personal care.     Dry mouth/Xerostomia - continue mouth care and moisturizer    *GIP HOSPICE CARE - Requires skilled nursing assessments and interventions for symptom management with injectable medications. Patient is unable to communicate her needs.         Anupama Andre MD  06/08/18  5:26 PM      "

## 2018-06-08 NOTE — PLAN OF CARE
Problem: Dying Patient, Actively (Adult)  Goal: Comfort/Pain Control  Outcome: Ongoing (interventions implemented as appropriate)   06/06/18 1452   Dying Patient, Actively (Adult)   Comfort/Pain Control making progress toward outcome      06/06/18 1452   Dying Patient, Actively (Adult)   Comfort/Pain Control making progress toward outcome     Goal: Peace/Preservation of Dignity During the Dying Process  Outcome: Ongoing (interventions implemented as appropriate)   06/06/18 1452   Dying Patient, Actively (Adult)   Peace/Preservation of Dignity During the Dying Process making progress toward outcome

## 2018-06-09 NOTE — PROGRESS NOTES
"Hospice Progress Note    Patient Name: Lindsey Tsai   : 1945  Gender: female    Code Status: comfort measures    Date of Admission: 2018    Subjective:    Pt with arched back and neck flexion, nonverbal, + facial grimacing, does have eyes open, dazed look, not tracking -- she does not appear comfortable - seemingly in a position to breath better/get more air    Overnight son held scheduled medications    Much more significant third spacing noted today = anasarca    - Held: scheduled medications    - BM:       ROS:  Review of Systems   Unable to perform ROS: Patient unresponsive       Reviewed current scheduled and prn medications for route, type, dose and frequency.     •  acetaminophen **OR** acetaminophen  •  bisacodyl  •  glycopyrrolate  •  haloperidol lactate  •  LORazepam  •  Morphine  •  ondansetron    Objective:   BP 92/51 (BP Location: Right arm, Patient Position: Lying)   Pulse 76   Temp 94.3 °F (34.6 °C) (Rectal)   Resp 8   Ht 163 cm (64.17\")   Wt 73.6 kg (162 lb 4.1 oz)   SpO2 100%   BMI 27.70 kg/m²    Intake & Output (last day)        0701 -  0700  0701 - 06/10 0700    P.O.      Other      Total Intake(mL/kg)      Urine (mL/kg/hr) 75 (0)     Total Output 75      Net -75                Lab Results (last 24 hours)     ** No results found for the last 24 hours. **        Imaging Results (last 24 hours)     ** No results found for the last 24 hours. **          PPS: Palliative Performance Scale score as of 2018, 10:52 AM is 10% based on the following measures:   Ambulation: Totally bed bound  Activity and Evidence of Disease: Unable to do any work, extensive evidence of disease  Self-Care: Total care  Intake:  Mouth care only  LOC: Drowsy or coma      Physical Exam  Constitutional: ? Distress - pt does not appear comfortable. Neck flexed, back arched  HENT:   + temporal wasting   Eyes: open, not tracking  Neck: No JVD present. No tracheal deviation present. " "  Cardiovascular: Normal rate and regular rhythm.    No murmur heard. + radial pulses   Pulmonary/Chest: shallow - at times seemingly holding her breath - 2/2 discomfort?   Musculoskeletal: She exhibits significant edema - anasarca. +3 pitting edema BLE, thighs, abd, BUE - upon examination a light touch causes examiners fingers to just sink into pt's skin - no weeping noted    Neurological: Does not follow commands   Skin: Skin is dry. She is not diaphoretic. There is pallor.   Psychiatric: ++ Facial grimacing      Principal Problem:    CAD (coronary artery disease)  Active Problems:    Chronic systolic heart failure    Type 2 diabetes mellitus    GI bleed    Acute blood loss anemia    Hypothyroidism    Decubitus ulcer, bilateral heels and coccyx    PAF (paroxysmal atrial fibrillation)      Assessment/Plan:     Multiple conversations with son regarding end of life s/s and symptom mgmt. discussed rationale for low dose scheduled medication being for comfort.  This will not change patient's disease trajectory and expected anticipated death.  This will help provide comfort for her end-of-life.  Son is not ready to \"let her go\".  Reiterated that the timeframe is never up to us our goal is to continue to provide support to her as well as him during this difficult time.  Discussed with nursing my concerns with how well the son will handle patient end-of-life.  Patient continues to decline with her death being more imminent.    *will need to discuss postmortem care for pt as son has no funding for burial    - pt is third spacing so significantly that bladder scanning has questionable accuracy. Pt with known ESRD and renal output is expected to be minimal if any    - continue to encourage scheduled and prn use    - support pt and son through this difficult time    Total Visit Time: 45 min   Face to Face Time: 30 min    Justification for care:  Patient meets criteria for acute in-patient care with required nursing assessment " and interventions for symptoms with IV medications.      ELIOT Coleman  (C) [phone]  (O) [phone]  06/09/18  10:52 AM

## 2018-06-09 NOTE — PLAN OF CARE
Problem: Dying Patient, Actively (Adult)  Goal: Identify Related Risk Factors and Signs and Symptoms  Outcome: Ongoing (interventions implemented as appropriate)   06/06/18 5083   Dying Patient, Actively (Adult)   Related Risk Factors (Actively Dying Patient) age/developmental level;disease progression   Signs and Symptoms (Actively Dying Patient) awareness reduced;changes in bladder elimination pattern;changes in bowel elimination pattern;total dependency;food/fluid disinterest/withdrawal;profound weakness

## 2018-06-09 NOTE — PROGRESS NOTES
Continued Stay Note  [facility]     Patient Name: Lindsey Tsai  MRN: [id number]  Today's Date: 6/9/2018    Admit Date: 6/6/2018          Discharge Plan     Row Name 06/09/18 1149       Plan    Plan [facility] inpatient hospice    Plan Comments Chart reviewed.  Patient not resting comfortably at this time, eyes closed. Patient has neck distended back and moving shoulders. Family is at bedside. Discussed with son about medications that would help make the patient more comfortable. Son has been withholding patients scheduled medications due to he thought she was getting to much. Son is agreeable since she just had a dose of Morphine 1mg IV not long ago that it was ok for the nurse to give the patient Ativan 0.5mg IV PRN now. Given when necessary Robinul and Morphine overnight. Son continues to need education regarding indications for medications. Patient continues to require GIP hospice care for skilled nursing assessment for nonverbal signs of pain and discomfort, interventions for symptom management of pain, dyspnea, and restlessness with scheduled and when necessary IV med administration, and monitoring for effectiveness of these meds.  If hospice team can be of further assist call extension [phone].              Discharge Codes    No documentation.           Terrance Dos Santos RN

## 2018-06-09 NOTE — NURSING NOTE
"Lengthy discussion held with son at bedside regarding Morphine administration for comfort.  Son refuses dose of Morphine because \"it decreases respirations and I don't want to push her on.  I know a lot about opiates and I know that's what it does.\"  Continued to encourage administration of Morphine for comfort and end-of-life care.  PRN dose of Morphine discussed with son and encouraged him to call for assistance with signs of distress or discomfort from patient.  "

## 2018-06-09 NOTE — PLAN OF CARE
Problem: Dying Patient, Actively (Adult)  Goal: Comfort/Pain Control  Outcome: Ongoing (interventions implemented as appropriate)   06/06/18 1452   Dying Patient, Actively (Adult)   Comfort/Pain Control making progress toward outcome     Goal: Peace/Preservation of Dignity During the Dying Process  Outcome: Ongoing (interventions implemented as appropriate)   06/09/18 1544   Dying Patient, Actively (Adult)   Peace/Preservation of Dignity During the Dying Process making progress toward outcome

## 2018-06-10 NOTE — PROGRESS NOTES
Continued Stay Note  [facility]     Patient Name: Lindsey Tsai  MRN: [id number]  Today's Date: 6/10/2018    Admit Date: 6/6/2018          Discharge Plan     Row Name 06/10/18 1110       Plan    Plan [facility] inpatient hospice    Plan Comments Chart reviewed.  Patient resting comfortably at this time with eyes closed.  Family is at bedside asleep. Son continues to withhold patients scheduled medications. Patient received Morphine 2mg x 1 PRN and Ativan 0.5mg x 1 PRN dose through the night. Patient continues to have low output with third spacing in the lower extremities, thighs and upper extremities. Given when necessary Robinul, Ativan, Morphine. Son continues to need education regarding indications for medications. Patient continues to require [facility]IP hospice care for skilled nursing assessment for nonverbal signs of pain and discomfort, interventions for symptom management of pain, dyspnea, and restlessness with scheduled and when necessary IV med administration, and monitoring for effectiveness of these meds.  If hospice team can be of further assist call extension [phone]              Discharge Codes    No documentation.           Terrance Dos Santos RN

## 2018-06-10 NOTE — PLAN OF CARE
Problem: Dying Patient, Actively (Adult)  Goal: Identify Related Risk Factors and Signs and Symptoms  Outcome: Ongoing (interventions implemented as appropriate)   06/06/18 1452   Dying Patient, Actively (Adult)   Related Risk Factors (Actively Dying Patient) age/developmental level;disease progression   Signs and Symptoms (Actively Dying Patient) awareness reduced;changes in bladder elimination pattern;changes in bowel elimination pattern;total dependency;food/fluid disinterest/withdrawal;profound weakness     Goal: Comfort/Pain Control  Outcome: Ongoing (interventions implemented as appropriate)    Goal: Peace/Preservation of Dignity During the Dying Process  Outcome: Ongoing (interventions implemented as appropriate)   06/10/18 0323   Dying Patient, Actively (Adult)   Peace/Preservation of Dignity During the Dying Process making progress toward outcome

## 2018-06-10 NOTE — PLAN OF CARE
Problem: Dying Patient, Actively (Adult)  Goal: Identify Related Risk Factors and Signs and Symptoms  Outcome: Ongoing (interventions implemented as appropriate)   06/06/18 1452   Dying Patient, Actively (Adult)   Related Risk Factors (Actively Dying Patient) age/developmental level;disease progression   Signs and Symptoms (Actively Dying Patient) awareness reduced;changes in bladder elimination pattern;changes in bowel elimination pattern;total dependency;food/fluid disinterest/withdrawal;profound weakness     Goal: Comfort/Pain Control  Outcome: Ongoing (interventions implemented as appropriate)    Goal: Peace/Preservation of Dignity During the Dying Process  Outcome: Ongoing (interventions implemented as appropriate)      Problem: Fall Risk (Adult)  Goal: Identify Related Risk Factors and Signs and Symptoms  Outcome: Outcome(s) achieved Date Met: 06/10/18    Goal: Absence of Fall  Outcome: Ongoing (interventions implemented as appropriate)

## 2018-06-10 NOTE — PROGRESS NOTES
"Hospice Progress Note    Patient Name: Lindsey Tsai   : 1945  Gender: female    Code Status: comfort measures    Date of Admission: 2018    Subjective:    Pt resting much more comfortable today. Son sleeping at bedside.    - PRNs: ativan x2, morphine x2    - Held: 6p dose of morphine, palliative oral rinse    - BM:       ROS:  Review of Systems   Unable to perform ROS: Patient unresponsive       Reviewed current scheduled and prn medications for route, type, dose and frequency.     •  acetaminophen **OR** acetaminophen  •  bisacodyl  •  glycopyrrolate  •  haloperidol lactate  •  LORazepam  •  Morphine  •  ondansetron    Objective:   BP 92/51 (BP Location: Right arm, Patient Position: Lying)   Pulse 76   Temp 94.3 °F (34.6 °C) (Rectal)   Resp 8   Ht 163 cm (64.17\")   Wt 73.6 kg (162 lb 4.1 oz)   SpO2 100%   BMI 27.70 kg/m²    Intake & Output (last day)     None        Lab Results (last 24 hours)     ** No results found for the last 24 hours. **        Imaging Results (last 24 hours)     ** No results found for the last 24 hours. **          PPS: Palliative Performance Scale score as of 6/10/2018, 7:06 PM is 10% based on the following measures:   Ambulation: Totally bed bound  Activity and Evidence of Disease: Unable to do any work, extensive evidence of disease  Self-Care: Total care  Intake:  Mouth care only  LOC: Drowsy or coma      Physical Exam  Constitutional: Appears much more comfortable today - prns effective  HENT:   + temporal wasting   Eyes: closed - resting comfortably  Neck: No JVD present. No tracheal deviation present.   Cardiovascular: DHT. radial pulses not palpable  Pulmonary/Chest: shallow, apenic pauses noted  Musculoskeletal: She exhibits significant edema - same as yesterday - anasarca. +3 pitting edema BLE, thighs, abd, BUE - upon examination a light touch causes examiners fingers to just sink into pt's skin - no weeping noted    Neurological: Does not follow " commands   Skin: Skin is dry. She is not diaphoretic. There is pallor.   Psychiatric: No Facial grimacing      Principal Problem:    CAD (coronary artery disease)  Active Problems:    Chronic systolic heart failure    Type 2 diabetes mellitus    GI bleed    Acute blood loss anemia    Hypothyroidism    Decubitus ulcer, bilateral heels and coccyx    PAF (paroxysmal atrial fibrillation)      Assessment/Plan:     Constipation - dul supp    Continue current plan of care    Monitor for increased need    Continue to support the son at pt's end of life    Total Visit Time: 15 min  Face to Face Time: 5 min    Justification for care:  Patient meets criteria for acute in-patient care with required nursing assessment and interventions for symptoms with IV medications.      ELIOT Coleman  (C) [phone]  (O) [phone]  06/10/18  6:55 AM

## 2018-06-11 NOTE — NURSING NOTE
Hospice RN and  attended death and provided hospice support and offered condolences to son at bedside.  Ensured he had hospice phone number to reach for access to bereavement services.

## 2018-06-11 NOTE — SIGNIFICANT NOTE
Exam confirms with auscultation zero audible heart tones and zero audible respirations. Ms.Judith DARRIUS Tsai was pronounced dead at 12:30.  Son at bedside.  MD notified by Patient's RN.    Adrian Aldana RN  Clinical House Supervisor  6/11/2018 12:59 PM

## 2018-06-12 NOTE — DISCHARGE SUMMARY
Date of Death: 6/11/18  Time of Death:  1230    Presenting Problem/History of Present Illness  Principal Problem:    CAD (coronary artery disease)  Active Problems:    Chronic systolic heart failure    Type 2 diabetes mellitus    GI bleed    Acute blood loss anemia    Hypothyroidism    Decubitus ulcer, bilateral heels and coccyx    PAF (paroxysmal atrial fibrillation)        Hospital Course    Patient is a 72 y.o. female admitted to the hospital on 5/16/18 for GIB and Anemia (Hgb 5). EGD normal, bleeding resolved, self-limiting (GI signed off). Pt with ESRD and has declined dialysis or further renal work-up (Nephrology signed off). CHF / Afib /severe CM/ HFrEF. Hypotension. Cardiorenal syndrome. Decline inevitable in the setting of ESRD without dialysis.     PMHx: CAD, ICM EF 25%, PAF, DM, CKD V, recent GIB, buttocks pressure injury, hypothyroid, ESRD     Pt has continued to decline throughout hospitalization and ultimately family elected for comfort measures with inpt Hospice. Pt was admitted to inpt Hospice on 6/6/18 for mgmt of acute symptoms (dyspnea, pain, anorexia).      Social History:  Social History   Substance Use Topics   • Smoking status: Former Smoker   • Smokeless tobacco: Not on file   • Alcohol use No         Consults:   Consults     Date and Time Order Name Status Description    5/18/2018 0943 Inpatient Cardiology Consult Completed     5/16/2018 1359 Inpatient Palliative Care MD Consult Completed     5/16/2018 0108 Inpatient Gastroenterology Consult Completed         Exam confirms with auscultation zero audible heart tones and zero audible respirations. Ms.Judith DARRIUS Tsai was pronounced dead at 12:30.  Son at bedside.  MD notified by Patient's RN.     Adrian Aldana RN  Clinical House Supervisor  6/11/2018 12:59 PM    ELIOT Coleman  06/11/18  9:12 PM

## (undated) DEVICE — SINGLE-USE BIOPSY FORCEPS: Brand: RADIAL JAW 4

## (undated) DEVICE — THE BITE BLOCK MAXI, LATEX FREE STRAP IS USED TO PROTECT THE ENDOSCOPE INSERTION TUBE FROM BEING BITTEN BY THE PATIENT.

## (undated) DEVICE — Device: Brand: DEFENDO AIR/WATER/SUCTION AND BIOPSY VALVE